# Patient Record
Sex: MALE | Race: WHITE | NOT HISPANIC OR LATINO | URBAN - METROPOLITAN AREA
[De-identification: names, ages, dates, MRNs, and addresses within clinical notes are randomized per-mention and may not be internally consistent; named-entity substitution may affect disease eponyms.]

---

## 2020-12-07 ENCOUNTER — OFFICE VISIT (OUTPATIENT)
Dept: URGENT CARE | Facility: CLINIC | Age: 25
End: 2020-12-07
Payer: OTHER GOVERNMENT

## 2020-12-07 VITALS
WEIGHT: 215.2 LBS | RESPIRATION RATE: 18 BRPM | DIASTOLIC BLOOD PRESSURE: 84 MMHG | HEIGHT: 70 IN | BODY MASS INDEX: 30.81 KG/M2 | SYSTOLIC BLOOD PRESSURE: 130 MMHG | TEMPERATURE: 96.5 F | HEART RATE: 79 BPM | OXYGEN SATURATION: 99 %

## 2020-12-07 DIAGNOSIS — Z20.822 ENCOUNTER FOR SCREENING LABORATORY TESTING FOR COVID-19 VIRUS: ICD-10-CM

## 2020-12-07 DIAGNOSIS — R05.9 COUGH: Primary | ICD-10-CM

## 2020-12-07 PROCEDURE — 99213 OFFICE O/P EST LOW 20 MIN: CPT | Performed by: PREVENTIVE MEDICINE

## 2020-12-07 PROCEDURE — U0003 INFECTIOUS AGENT DETECTION BY NUCLEIC ACID (DNA OR RNA); SEVERE ACUTE RESPIRATORY SYNDROME CORONAVIRUS 2 (SARS-COV-2) (CORONAVIRUS DISEASE [COVID-19]), AMPLIFIED PROBE TECHNIQUE, MAKING USE OF HIGH THROUGHPUT TECHNOLOGIES AS DESCRIBED BY CMS-2020-01-R: HCPCS | Performed by: PREVENTIVE MEDICINE

## 2020-12-07 RX ORDER — MOMETASONE FUROATE 220 UG/1
INHALANT RESPIRATORY (INHALATION) DAILY
COMMUNITY
Start: 2020-10-01

## 2020-12-07 RX ORDER — FLUTICASONE PROPIONATE 50 MCG
1 SPRAY, SUSPENSION (ML) NASAL DAILY
COMMUNITY

## 2020-12-07 RX ORDER — GUANFACINE 1 MG/1
TABLET ORAL
COMMUNITY
Start: 2020-10-13

## 2020-12-07 RX ORDER — BROMPHENIRAMINE MALEATE, PSEUDOEPHEDRINE HYDROCHLORIDE, AND DEXTROMETHORPHAN HYDROBROMIDE 2; 30; 10 MG/5ML; MG/5ML; MG/5ML
5 SYRUP ORAL 4 TIMES DAILY PRN
Qty: 120 ML | Refills: 0 | Status: SHIPPED | OUTPATIENT
Start: 2020-12-07

## 2020-12-07 RX ORDER — LAMOTRIGINE 300 MG/1
300 TABLET, EXTENDED RELEASE ORAL DAILY
COMMUNITY
Start: 2020-10-13

## 2020-12-09 ENCOUNTER — TELEPHONE (OUTPATIENT)
Dept: URGENT CARE | Age: 25
End: 2020-12-09

## 2020-12-09 LAB — SARS-COV-2 RNA SPEC QL NAA+PROBE: DETECTED

## 2022-08-22 ENCOUNTER — OFFICE VISIT (OUTPATIENT)
Dept: URGENT CARE | Facility: CLINIC | Age: 27
End: 2022-08-22
Payer: COMMERCIAL

## 2022-08-22 VITALS
OXYGEN SATURATION: 99 % | TEMPERATURE: 98.5 F | RESPIRATION RATE: 18 BRPM | HEART RATE: 109 BPM | HEIGHT: 70 IN | BODY MASS INDEX: 34.22 KG/M2 | WEIGHT: 239 LBS

## 2022-08-22 DIAGNOSIS — B00.1 HERPES LABIALIS: Primary | ICD-10-CM

## 2022-08-22 DIAGNOSIS — L01.00 IMPETIGO: ICD-10-CM

## 2022-08-22 PROCEDURE — 99203 OFFICE O/P NEW LOW 30 MIN: CPT | Performed by: PHYSICIAN ASSISTANT

## 2022-08-22 RX ORDER — VALACYCLOVIR HYDROCHLORIDE 1 G/1
1000 TABLET, FILM COATED ORAL 3 TIMES DAILY
Qty: 21 TABLET | Refills: 0 | Status: SHIPPED | OUTPATIENT
Start: 2022-08-22 | End: 2022-08-29

## 2022-08-22 NOTE — PROGRESS NOTES
[facility]        NAME: Rianna Heath is a 27 y o  male  : 1995    MRN: [id number]  DATE: 2022  TIME: 11:52 AM    Assessment and Plan   Herpes labialis [B00 1]  1  Herpes labialis  valACYclovir (VALTREX) 1,000 mg tablet   2  Impetigo  mupirocin (BACTROBAN) 2 % ointment     Will treat with a weeks' worth of valtrex given proximity to eye  Discussed strict return to care precautions as well as red flag symptoms which should prompt immediate ED referral  Pt verbalized understanding and is in agreement with plan  Please follow up with your primary care provider within the next week  Please remember that your visit today was with an urgent care provider and should not replace follow up with your primary care provider for chronic medical issues or annual physicals  Patient Instructions       Follow up with PCP in 3-5 days  Proceed to  ER if symptoms worsen  Chief Complaint     Chief Complaint   Patient presents with    Rash     Herpes like inflammation on upper lip and eye lid x 2 days         History of Present Illness       Patient presents with cold sore on upper central vermilion border and similar appearing rash just medial to medial canthus of right eye  States he had the same rash a few years ago and was told that they were both herpes  Slightly itchy  No visual changes, fevers  States he was sick with a bug 2 weeks ago  Started using Abreva yesterday  Review of Systems   Review of Systems   Constitutional: Negative for chills, diaphoresis and fever  HENT: Negative for congestion, rhinorrhea and sore throat  Eyes: Negative for discharge and itching  Respiratory: Negative for cough, chest tightness, shortness of breath and wheezing  Cardiovascular: Negative for chest pain  Gastrointestinal: Negative for diarrhea, nausea and vomiting  Musculoskeletal: Negative for myalgias  Skin: Positive for rash     Neurological: Negative for weakness and numbness  Current Medications       Current Outpatient Medications:     fluticasone (FLONASE) 50 mcg/act nasal spray, 1 spray into each nostril daily, Disp: , Rfl:     lamoTRIgine  MG TB24, Take 300 mg by mouth daily, Disp: , Rfl:     mometasone (Asmanex, 120 Metered Doses,) 220 MCG/INH inhaler, daily, Disp: , Rfl:     mupirocin (BACTROBAN) 2 % ointment, Apply topically 3 (three) times a day, Disp: 22 g, Rfl: 0    valACYclovir (VALTREX) 1,000 mg tablet, Take 1 tablet (1,000 mg total) by mouth 3 (three) times a day for 7 days, Disp: 21 tablet, Rfl: 0    brompheniramine-pseudoephedrine-DM 30-2-10 MG/5ML syrup, Take 5 mL by mouth 4 (four) times a day as needed for congestion or cough (Patient not taking: Reported on 8/22/2022), Disp: 120 mL, Rfl: 0    guanFACINE (TENEX) 1 mg tablet, daily at bedtime (Patient not taking: Reported on 8/22/2022), Disp: , Rfl:     Current Allergies     Allergies as of 08/22/2022 - Reviewed 08/22/2022   Allergen Reaction Noted    Cefaclor Hives 10/13/2020    Erythromycin Other (See Comments) 10/13/2020            The following portions of the patient's history were reviewed and updated as appropriate: allergies, current medications, past family history, past medical history, past social history, past surgical history and problem list      Past Medical History:   Diagnosis Date    ADHD (attention deficit hyperactivity disorder)     Asthma        No past surgical history on file  No family history on file  Medications have been verified  Objective   Pulse (!) 109   Temp 98 5 °F (36 9 °C)   Resp 18   Ht 5' 10" (1 778 m)   Wt 108 kg (239 lb)   SpO2 99%   BMI 34 29 kg/m²        Physical Exam     Physical Exam  Vitals and nursing note reviewed  Constitutional:       General: He is not in acute distress  Appearance: Normal appearance  He is not ill-appearing  HENT:      Head: Normocephalic and atraumatic       Cardiovascular:      Rate and Rhythm: Normal rate  Pulmonary:      Effort: Pulmonary effort is normal  No respiratory distress  Skin:     General: Skin is warm and dry  Capillary Refill: Capillary refill takes less than 2 seconds  Neurological:      Mental Status: He is alert and oriented to person, place, and time     Psychiatric:         Behavior: Behavior normal

## 2023-03-07 ENCOUNTER — OFFICE VISIT (OUTPATIENT)
Dept: FAMILY MEDICINE CLINIC | Facility: CLINIC | Age: 28
End: 2023-03-07

## 2023-03-07 VITALS
BODY MASS INDEX: 35.55 KG/M2 | WEIGHT: 240 LBS | SYSTOLIC BLOOD PRESSURE: 140 MMHG | RESPIRATION RATE: 16 BRPM | TEMPERATURE: 97.9 F | OXYGEN SATURATION: 98 % | DIASTOLIC BLOOD PRESSURE: 100 MMHG | HEIGHT: 69 IN | HEART RATE: 107 BPM

## 2023-03-07 DIAGNOSIS — Z11.59 NEED FOR HEPATITIS C SCREENING TEST: ICD-10-CM

## 2023-03-07 DIAGNOSIS — Z11.4 SCREENING FOR HIV (HUMAN IMMUNODEFICIENCY VIRUS): ICD-10-CM

## 2023-03-07 DIAGNOSIS — J45.20 MILD INTERMITTENT ASTHMA WITHOUT COMPLICATION: ICD-10-CM

## 2023-03-07 DIAGNOSIS — Z13.89 SCREENING FOR CARDIOVASCULAR, RESPIRATORY, AND GENITOURINARY DISEASES: ICD-10-CM

## 2023-03-07 DIAGNOSIS — F95.9 TIC DISORDER: ICD-10-CM

## 2023-03-07 DIAGNOSIS — Z13.29 SCREENING FOR THYROID DISORDER: ICD-10-CM

## 2023-03-07 DIAGNOSIS — Z13.6 SCREENING FOR CARDIOVASCULAR, RESPIRATORY, AND GENITOURINARY DISEASES: ICD-10-CM

## 2023-03-07 DIAGNOSIS — Z13.83 SCREENING FOR CARDIOVASCULAR, RESPIRATORY, AND GENITOURINARY DISEASES: ICD-10-CM

## 2023-03-07 DIAGNOSIS — G40.909 SEIZURE DISORDER (HCC): ICD-10-CM

## 2023-03-07 DIAGNOSIS — Z00.00 WELL ADULT EXAM: Primary | ICD-10-CM

## 2023-03-07 RX ORDER — FLUTICASONE FUROATE 100 UG/1
POWDER RESPIRATORY (INHALATION)
COMMUNITY
Start: 2022-07-01

## 2023-03-07 RX ORDER — GUANFACINE 1 MG/1
1 TABLET ORAL
Start: 2023-03-07

## 2023-03-07 NOTE — ASSESSMENT & PLAN NOTE
Follows neurologist Dr Morenita Kim yearly  He was diagnosed with seizure disorder in 2011 and has been on Lamictal since then  No seizures since the one seizure in 2011

## 2023-03-07 NOTE — PROGRESS NOTES
FAMILY PRACTICE HEALTH MAINTENANCE OFFICE VISIT  [facility]    NAME: Shwetha Tate  AGE: 32 y o  SEX: male  : 1995     DATE: 3/7/2023    Assessment and Plan     1  Well adult exam    2  Need for hepatitis C screening test  -     Hepatitis C Antibody ([facility], [facility]); Future    3  Screening for HIV (human immunodeficiency virus)  -     HIV 1/2 Antigen/Antibody (Fourth Generation) with Reflex Testing ([facility], [facility], or EXTERNAL LAB); Future    4  Screening for cardiovascular, respiratory, and genitourinary diseases  -     CBC and differential; Future  -     Comprehensive metabolic panel; Future  -     Lipid Panel with Direct LDL reflex; Future    5  Screening for thyroid disorder  -     TSH, 3rd generation with Free T4 reflex; Future    6  Tic disorder  Assessment & Plan:  Managed by neurologist Dr Nadir Valles  Currently stable on guanfacine  Orders:  -     guanFACINE (TENEX) 1 mg tablet; Take 1 tablet (1 mg total) by mouth daily at bedtime    7  Seizure disorder [facility])  Assessment & Plan:  [address] [place] [address] neurologist Dr Nadir Valles yearly  He was diagnosed with seizure disorder in  and has been on Lamictal since then  No seizures since the one seizure in         8  Mild intermittent asthma without complication  Assessment & Plan:  Controlled on Arnuity  Patient Counseling:   Nutrition: Stressed importance of a well balanced diet, moderation of sodium/saturated fat, caloric balance and sufficient intake of fiber  Exercise: Stressed the importance of regular exercise with a goal of 150 minutes per week  Dental Health: Discussed daily flossing and brushing and regular dental visits   Immunizations reviewed: Up To Date  Discussed benefits of:  Screening labs  BMI Counseling: Body mass index is 35 96 kg/m²  Discussed with patient's BMI with him   The BMI is above normal  Nutrition recommendations include reducing portion sizes, decreasing overall calorie intake, 3-5 servings of fruits/vegetables daily, reducing fast food intake, consuming healthier snacks and decreasing soda and/or juice intake  Exercise recommendations include moderate aerobic physical activity for 150 minutes/week  Return in about 2 weeks (around 3/21/2023)  Chief Complaint     Chief Complaint   Patient presents with   • \[facility]\"" Care     [facility]       History of Present Illness     HPI    Well Adult Physical   Patient here for a comprehensive physical exam       Diet and Physical Activity  Diet: well balanced diet  Exercise: infrequently      Depression Screen  PHQ-2/9 Depression Screening    Little interest or pleasure in doing things: 0 - not at all  Feeling down, depressed, or hopeless: 1 - several days  PHQ-2 Score: 1  PHQ-2 Interpretation: Negative depression screen          General Health  Hearing: Normal:  bilateral  Vision: no vision problems  Dental: no dental visits for >1 year, brushes teeth twice daily and flosses teeth occasionally    Reproductive Health  No issues       The following portions of the patient's history were reviewed and updated as appropriate: allergies, current medications, past family history, past medical history, past social history, past surgical history and problem list     Review of Systems     Review of Systems   Constitutional: Negative  HENT: Negative  Eyes: Negative  Respiratory: Negative  Cardiovascular: Negative  Gastrointestinal: Negative  Endocrine: Negative  Genitourinary: Negative  Musculoskeletal: Negative  Neurological: Negative  Hematological: Negative  Psychiatric/Behavioral: Negative  Past Medical History     Past Medical History:   Diagnosis Date   • ADHD (attention deficit hyperactivity disorder)    • Asthma        Past Surgical History     History reviewed  No pertinent surgical history      Social History     Social History     Socioeconomic History   • Marital status: Single     Spouse name: None   • Number of children: None   • Years of education: None   • Highest education level: None   Occupational History   • None   Tobacco Use   • Smoking status: Never   • Smokeless tobacco: Never   Substance and Sexual Activity   • Alcohol use: Yes   • Drug use: None     Comment: rare   • Sexual activity: Never   Other Topics Concern   • None   Social History Narrative   • None     Social Determinants of Health     Financial Resource Strain: Not on file   Food Insecurity: Not on file   Transportation Needs: Not on file   Physical Activity: Not on file   Stress: Not on file   Social Connections: Not on file   Intimate Partner Violence: Not on file   Housing Stability: Not on file       Family History     History reviewed  No pertinent family history  Current Medications       Current Outpatient Medications:   •  fluticasone (Arnuity Ellipta) 100 MCG/ACT AEPB inhaler, Daily at night, Disp: , Rfl:   •  fluticasone (FLONASE) 50 mcg/act nasal spray, 1 spray into each nostril daily, Disp: , Rfl:   •  guanFACINE (TENEX) 1 mg tablet, Take 1 tablet (1 mg total) by mouth daily at bedtime, Disp: , Rfl:   •  lamoTRIgine  MG TB24, Take 300 mg by mouth daily, Disp: , Rfl:   •  mupirocin (BACTROBAN) 2 % ointment, Apply topically 3 (three) times a day, Disp: 22 g, Rfl: 0  •  valACYclovir (VALTREX) 1,000 mg tablet, Take 1 tablet (1,000 mg total) by mouth 3 (three) times a day for 7 days, Disp: 21 tablet, Rfl: 0     Allergies     Allergies   Allergen Reactions   • Cefaclor Hives   • Erythromycin Other (See Comments)     unknown       Objective     /100 (BP Location: Left arm, Patient Position: Sitting, Cuff Size: Large)   Pulse (!) 107   Temp 97 9 °F (36 6 °C) (Temporal)   Resp 16   Ht 5' 8 5" (1 74 m)   Wt 109 kg (240 lb)   SpO2 98%   BMI 35 96 kg/m²      Physical Exam  Constitutional:       General: He is not in acute distress  Appearance: He is well-developed  He is not diaphoretic     HENT:      Head: Normocephalic and atraumatic  Right Ear: Hearing, tympanic membrane, ear canal and external ear normal       Left Ear: Hearing, tympanic membrane, ear canal and external ear normal       Nose: Nose normal       Mouth/Throat:      Pharynx: No oropharyngeal exudate  Eyes:      General: No scleral icterus  Right eye: No discharge  Left eye: No discharge  Conjunctiva/sclera: Conjunctivae normal       Pupils: Pupils are equal, round, and reactive to light  Neck:      Thyroid: No thyromegaly  Trachea: No tracheal deviation  Cardiovascular:      Rate and Rhythm: Normal rate and regular rhythm  Heart sounds: Normal heart sounds  No murmur heard  No friction rub  No gallop  Pulmonary:      Effort: Pulmonary effort is normal  No respiratory distress  Breath sounds: Normal breath sounds  No wheezing or rales  Chest:      Chest wall: No tenderness  Abdominal:      General: Bowel sounds are normal  There is no distension  Palpations: Abdomen is soft  There is no mass  Tenderness: There is no abdominal tenderness  There is no guarding or rebound  Musculoskeletal:         General: No tenderness or deformity  Normal range of motion  Cervical back: Normal range of motion and neck supple  Skin:     General: Skin is warm and dry  Coloration: Skin is not pale  Findings: No erythema or rash  Neurological:      Mental Status: He is alert and oriented to person, place, and time  Motor: No abnormal muscle tone  Coordination: Coordination normal       Deep Tendon Reflexes: Reflexes normal    Psychiatric:         Behavior: Behavior normal          Thought Content: Thought content normal          Judgment: Judgment normal            No results found          MD SALONI SchroederNSAS [facility]

## 2023-03-17 LAB
ALBUMIN SERPL-MCNC: 5 G/DL (ref 4.1–5.2)
ALBUMIN/GLOB SERPL: 2.3 {RATIO} (ref 1.2–2.2)
ALP SERPL-CCNC: 80 IU/L (ref 44–121)
ALT SERPL-CCNC: 58 IU/L (ref 0–44)
AST SERPL-CCNC: 32 IU/L (ref 0–40)
BASOPHILS # BLD AUTO: 0 X10E3/UL (ref 0–0.2)
BASOPHILS NFR BLD AUTO: 1 %
BILIRUB SERPL-MCNC: 0.6 MG/DL (ref 0–1.2)
BUN SERPL-MCNC: 11 MG/DL (ref 6–20)
BUN/CREAT SERPL: 10 (ref 9–20)
CALCIUM SERPL-MCNC: 9.9 MG/DL (ref 8.7–10.2)
CHLORIDE SERPL-SCNC: 104 MMOL/L (ref 96–106)
CHOLEST SERPL-MCNC: 132 MG/DL (ref 100–199)
CO2 SERPL-SCNC: 24 MMOL/L (ref 20–29)
CREAT SERPL-MCNC: 1.07 MG/DL (ref 0.76–1.27)
EGFR: 96 ML/MIN/1.73
EOSINOPHIL # BLD AUTO: 0.3 X10E3/UL (ref 0–0.4)
EOSINOPHIL NFR BLD AUTO: 6 %
ERYTHROCYTE [DISTWIDTH] IN BLOOD BY AUTOMATED COUNT: 12.3 % (ref 11.6–15.4)
GLOBULIN SER-MCNC: 2.2 G/DL (ref 1.5–4.5)
GLUCOSE SERPL-MCNC: 88 MG/DL (ref 70–99)
HCT VFR BLD AUTO: 45.5 % (ref 37.5–51)
HCV AB S/CO SERPL IA: NON REACTIVE
HDLC SERPL-MCNC: 48 MG/DL
HGB BLD-MCNC: 16.6 G/DL (ref 13–17.7)
HIV 1+2 AB+HIV1 P24 AG SERPL QL IA: NON REACTIVE
IMM GRANULOCYTES # BLD: 0 X10E3/UL (ref 0–0.1)
IMM GRANULOCYTES NFR BLD: 0 %
LDLC SERPL CALC-MCNC: 72 MG/DL (ref 0–99)
LYMPHOCYTES # BLD AUTO: 1.8 X10E3/UL (ref 0.7–3.1)
LYMPHOCYTES NFR BLD AUTO: 29 %
MCH RBC QN AUTO: 31.3 PG (ref 26.6–33)
MCHC RBC AUTO-ENTMCNC: 36.5 G/DL (ref 31.5–35.7)
MCV RBC AUTO: 86 FL (ref 79–97)
MICRODELETION SYND BLD/T FISH: NORMAL
MONOCYTES # BLD AUTO: 0.5 X10E3/UL (ref 0.1–0.9)
MONOCYTES NFR BLD AUTO: 8 %
MORPHOLOGY BLD-IMP: ABNORMAL
NEUTROPHILS # BLD AUTO: 3.4 X10E3/UL (ref 1.4–7)
NEUTROPHILS NFR BLD AUTO: 56 %
PLATELET # BLD AUTO: 264 X10E3/UL (ref 150–450)
POTASSIUM SERPL-SCNC: 4.7 MMOL/L (ref 3.5–5.2)
PROT SERPL-MCNC: 7.2 G/DL (ref 6–8.5)
RBC # BLD AUTO: 5.3 X10E6/UL (ref 4.14–5.8)
SODIUM SERPL-SCNC: 141 MMOL/L (ref 134–144)
TRIGL SERPL-MCNC: 53 MG/DL (ref 0–149)
TSH SERPL DL<=0.005 MIU/L-ACNC: 1.56 UIU/ML (ref 0.45–4.5)
WBC # BLD AUTO: 6 X10E3/UL (ref 3.4–10.8)

## 2023-03-21 ENCOUNTER — OFFICE VISIT (OUTPATIENT)
Dept: FAMILY MEDICINE CLINIC | Facility: CLINIC | Age: 28
End: 2023-03-21

## 2023-03-21 VITALS
SYSTOLIC BLOOD PRESSURE: 128 MMHG | WEIGHT: 234 LBS | RESPIRATION RATE: 16 BRPM | HEIGHT: 69 IN | HEART RATE: 100 BPM | DIASTOLIC BLOOD PRESSURE: 88 MMHG | BODY MASS INDEX: 34.66 KG/M2 | TEMPERATURE: 97.1 F

## 2023-03-21 DIAGNOSIS — R03.0 ELEVATED BLOOD PRESSURE READING: Primary | ICD-10-CM

## 2023-03-21 DIAGNOSIS — R74.01 ELEVATED ALT MEASUREMENT: ICD-10-CM

## 2023-03-21 NOTE — PROGRESS NOTES
Assessment/Plan:    1  Elevated blood pressure reading  Assessment & Plan:  Continue weight loss efforts, diet, exercise  Advised to limit salt  Will continue to monitor BP      2  Elevated ALT measurement  Comments:  no ETOH or excessive Tylenol  Discussed most likely fatty liver  Repeat labs at next CPE, consider additional testing at that time             There are no Patient Instructions on file for this visit  Return if symptoms worsen or fail to improve  Subjective:      Patient ID: Misa Ro is a 32 y o  male  Chief Complaint   Patient presents with   • Follow-up     Review lab work results and BP check Karey       Here today for BP check and lab review  BP was slightly elevated at previous visit  He has been working on diet and exercise and has lost 7 pounds since his last visit  No concerns today      The following portions of the patient's history were reviewed and updated as appropriate: allergies, current medications, past family history, past medical history, past social history, past surgical history and problem list     Review of Systems   Constitutional: Negative for chills, fatigue and fever  Respiratory: Negative for cough, shortness of breath and wheezing  Cardiovascular: Negative for chest pain, palpitations and leg swelling  Gastrointestinal: Negative for abdominal pain, diarrhea, nausea and vomiting  Skin: Negative for rash  Neurological: Negative for dizziness and headaches           Current Outpatient Medications   Medication Sig Dispense Refill   • fluticasone (Arnuity Ellipta) 100 MCG/ACT AEPB inhaler Daily at night     • fluticasone (FLONASE) 50 mcg/act nasal spray 1 spray into each nostril daily     • guanFACINE (TENEX) 1 mg tablet Take 1 tablet (1 mg total) by mouth daily at bedtime     • lamoTRIgine  MG TB24 Take 300 mg by mouth daily     • mupirocin (BACTROBAN) 2 % ointment Apply topically 3 (three) times a day 22 g 0   • valACYclovir (VALTREX) 1,000 mg tablet Take 1 tablet (1,000 mg total) by mouth 3 (three) times a day for 7 days 21 tablet 0     No current facility-administered medications for this visit  Objective:    /88   Pulse 100   Temp (!) 97 1 °F (36 2 °C)   Resp 16   Ht 5' 8 5" (1 74 m)   Wt 106 kg (234 lb)   BMI 35 06 kg/m²        Physical Exam  Vitals and nursing note reviewed  Constitutional:       Appearance: He is well-developed  Cardiovascular:      Rate and Rhythm: Normal rate and regular rhythm  Heart sounds: Normal heart sounds  No murmur heard  Pulmonary:      Effort: Pulmonary effort is normal       Breath sounds: Normal breath sounds  Skin:     General: Skin is warm and dry  Neurological:      Mental Status: He is alert                  Jacqui Sulaimna

## 2023-12-12 ENCOUNTER — OFFICE VISIT (OUTPATIENT)
Dept: FAMILY MEDICINE CLINIC | Facility: CLINIC | Age: 28
End: 2023-12-12
Payer: COMMERCIAL

## 2023-12-12 VITALS
WEIGHT: 240 LBS | RESPIRATION RATE: 18 BRPM | HEART RATE: 88 BPM | TEMPERATURE: 97.6 F | SYSTOLIC BLOOD PRESSURE: 130 MMHG | DIASTOLIC BLOOD PRESSURE: 80 MMHG | HEIGHT: 69 IN | BODY MASS INDEX: 35.55 KG/M2 | OXYGEN SATURATION: 98 %

## 2023-12-12 DIAGNOSIS — G40.909 SEIZURE DISORDER (HCC): Primary | ICD-10-CM

## 2023-12-12 DIAGNOSIS — J45.20 MILD INTERMITTENT ASTHMA WITHOUT COMPLICATION: ICD-10-CM

## 2023-12-12 DIAGNOSIS — F95.9 TIC DISORDER: ICD-10-CM

## 2023-12-12 DIAGNOSIS — R03.0 ELEVATED BLOOD PRESSURE READING: ICD-10-CM

## 2023-12-12 PROCEDURE — 99214 OFFICE O/P EST MOD 30 MIN: CPT | Performed by: FAMILY MEDICINE

## 2023-12-12 NOTE — PROGRESS NOTES
Name: Claudio Card      : 1995      MRN: [id number]  Encounter Provider: Xiomara Betancur MD  Encounter Date: 2023   Encounter department: [address] [place] [address]     1. Seizure disorder [facility])  Assessment & Plan:  [address] [facility]  neurologist Dr. Delvis Barcenas. On Keppra. No seizures since .       2. Tic disorder  Assessment & Plan:  Follows neurologist Dr. Delvis Barcenas. On Guanfacine. 3. Mild intermittent asthma without complication  Assessment & Plan:  [facility]  pulmonologist, stable. 4. Elevated blood pressure reading  Assessment & Plan:  BP is good in office today. Asymptomatic. Depression Screening and Follow-up Plan: Patient was screened for depression during today's encounter. They screened negative with a PHQ-2 score of 0. Subjective      HPI  He is here today for routine follow up visit. No acute issues or concerns. Review of Systems   Constitutional: Negative. HENT: Negative. Eyes: Negative. Respiratory: Negative. Gastrointestinal: Negative. Endocrine: Negative. Genitourinary: Negative. Musculoskeletal: Negative. Neurological: Negative. Hematological: Negative. Psychiatric/Behavioral: Negative.          Current Outpatient Medications on File Prior to Visit   Medication Sig    fluticasone (Arnuity Ellipta) 100 MCG/ACT AEPB inhaler Daily at night    fluticasone (FLONASE) 50 mcg/act nasal spray 1 spray into each nostril daily    guanFACINE (TENEX) 1 mg tablet Take 1 tablet (1 mg total) by mouth daily at bedtime    lamoTRIgine  MG TB24 Take 300 mg by mouth daily    valACYclovir (VALTREX) 1,000 mg tablet Take 1 tablet (1,000 mg total) by mouth 3 (three) times a day for 7 days    mupirocin (BACTROBAN) 2 % ointment Apply topically 3 (three) times a day (Patient not taking: Reported on 2023)       Objective     /80   Pulse 88   Temp 97.6 °F (36.4 °C)   Resp 18   Ht 5' 8.5" (1.74 m)   Wt 109 kg (240 lb) SpO2 98%   BMI 35.96 kg/m²     Physical Exam  Constitutional:       General: He is not in acute distress. Appearance: He is well-developed. He is not diaphoretic. HENT:      Head: Normocephalic and atraumatic. Eyes:      General: No scleral icterus. Right eye: No discharge. Left eye: No discharge. Conjunctiva/sclera: Conjunctivae normal.   Cardiovascular:      Rate and Rhythm: Normal rate and regular rhythm. Pulses: Normal pulses. Pulmonary:      Effort: Pulmonary effort is normal. No respiratory distress. Breath sounds: Normal breath sounds. No stridor. No wheezing, rhonchi or rales. Chest:      Chest wall: No tenderness. Musculoskeletal:         General: Normal range of motion. Cervical back: Normal range of motion. Skin:     General: Skin is warm. Neurological:      Mental Status: He is alert and oriented to person, place, and time. Psychiatric:         Mood and Affect: Mood normal.         Behavior: Behavior normal.         Thought Content:  Thought content normal.         Judgment: Judgment normal.       Wendy Mahoney MD

## 2024-05-14 ENCOUNTER — OFFICE VISIT (OUTPATIENT)
Dept: FAMILY MEDICINE CLINIC | Facility: CLINIC | Age: 29
End: 2024-05-14
Payer: COMMERCIAL

## 2024-05-14 VITALS
WEIGHT: 246.4 LBS | HEART RATE: 112 BPM | RESPIRATION RATE: 20 BRPM | TEMPERATURE: 98 F | HEIGHT: 69 IN | SYSTOLIC BLOOD PRESSURE: 122 MMHG | DIASTOLIC BLOOD PRESSURE: 98 MMHG | BODY MASS INDEX: 36.5 KG/M2

## 2024-05-14 DIAGNOSIS — B00.9 HERPES SIMPLEX: Primary | ICD-10-CM

## 2024-05-14 DIAGNOSIS — G40.909 SEIZURE DISORDER (HCC): ICD-10-CM

## 2024-05-14 PROCEDURE — 99213 OFFICE O/P EST LOW 20 MIN: CPT | Performed by: FAMILY MEDICINE

## 2024-05-14 RX ORDER — VALACYCLOVIR HYDROCHLORIDE 1 G/1
TABLET, FILM COATED ORAL
Qty: 40 TABLET | Refills: 0 | Status: SHIPPED | OUTPATIENT
Start: 2024-05-14 | End: 2025-05-14

## 2024-05-14 NOTE — PROGRESS NOTES
"Name: Pastor Tate      : 1995      MRN: [id number]  Encounter Provider: Lacy Kruger DO  Encounter Date: 2024   Encounter department: [facility]    Assessment & Plan     1. Herpes simplex  -     valACYclovir (VALTREX) 1,000 mg tablet; Take 2 at onset of herpes outbreak, then take another 2 in 12 hours.    2. Seizure disorder (HCC)  Assessment & Plan:  On lamotrigine  Managed by neurology       Return if symptoms worsen or fail to improve.    Subjective      He gets recurrent herpes on his right lower eye lid.  He has taken valtrex in the past       Review of Systems    Current Outpatient Medications on File Prior to Visit   Medication Sig   • fluticasone (Arnuity Ellipta) 100 MCG/ACT AEPB inhaler Daily at night   • fluticasone (FLONASE) 50 mcg/act nasal spray 1 spray into each nostril daily   • guanFACINE (TENEX) 1 mg tablet Take 1 tablet (1 mg total) by mouth daily at bedtime   • lamoTRIgine  MG TB24 Take 300 mg by mouth daily   • mupirocin (BACTROBAN) 2 % ointment Apply topically 3 (three) times a day   • [DISCONTINUED] valACYclovir (VALTREX) 1,000 mg tablet Take 1 tablet (1,000 mg total) by mouth 3 (three) times a day for 7 days (Patient not taking: Reported on 2024)       Objective     /98   Pulse (!) 112   Temp 98 °F (36.7 °C)   Resp 20   Ht 5' 8.5\" (1.74 m)   Wt 112 kg (246 lb 6.4 oz)   BMI 36.92 kg/m²     Physical Exam  Vitals and nursing note reviewed.   Constitutional:       Appearance: He is well-developed.   HENT:      Head: Normocephalic and atraumatic.      Right Ear: External ear normal.      Left Ear: External ear normal.   Cardiovascular:      Rate and Rhythm: Normal rate and regular rhythm.      Heart sounds: Normal heart sounds.   Pulmonary:      Effort: Pulmonary effort is normal. No respiratory distress.      Breath sounds: Normal breath sounds. No wheezing.   Skin:     Findings: Rash (right upper eyelid - see picture) present. "             Lacy Kruger, DO

## 2024-06-05 DIAGNOSIS — B00.9 HERPES SIMPLEX: ICD-10-CM

## 2024-06-06 RX ORDER — VALACYCLOVIR HYDROCHLORIDE 1 G/1
TABLET, FILM COATED ORAL
Qty: 120 TABLET | Refills: 1 | Status: SHIPPED | OUTPATIENT
Start: 2024-06-06 | End: 2025-06-06

## 2024-06-20 DIAGNOSIS — B00.9 HERPES SIMPLEX: ICD-10-CM

## 2024-06-20 RX ORDER — VALACYCLOVIR HYDROCHLORIDE 1 G/1
TABLET, FILM COATED ORAL
Qty: 360 TABLET | Refills: 1 | Status: SHIPPED | OUTPATIENT
Start: 2024-06-20 | End: 2024-07-04

## 2024-06-25 ENCOUNTER — OFFICE VISIT (OUTPATIENT)
Dept: URGENT CARE | Facility: CLINIC | Age: 29
End: 2024-06-25
Payer: COMMERCIAL

## 2024-06-25 VITALS
HEART RATE: 108 BPM | WEIGHT: 246 LBS | DIASTOLIC BLOOD PRESSURE: 102 MMHG | OXYGEN SATURATION: 100 % | BODY MASS INDEX: 36.86 KG/M2 | RESPIRATION RATE: 20 BRPM | TEMPERATURE: 96.8 F | SYSTOLIC BLOOD PRESSURE: 148 MMHG

## 2024-06-25 DIAGNOSIS — R21 PENILE RASH: Primary | ICD-10-CM

## 2024-06-25 PROCEDURE — 99213 OFFICE O/P EST LOW 20 MIN: CPT | Performed by: FAMILY MEDICINE

## 2024-06-25 NOTE — PROGRESS NOTES
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 Penile rash  terbinafine (LamISIL) 1 % cream            Patient Instructions     Small penile ulcerations present, appearing to stem from wet, damp environment. Recommend anti-fungal cream and OTC powder to help keep region dry. Pt denies need for STI testing today. Recommend follow up with PCP in 3-5 days if no improvement. Proceed to ER if symptoms worsen.    Chief Complaint     Chief Complaint   Patient presents with   • Rash     Started yesterday, on penis.          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 He states that he noticed a brownish, raw area on the glans penis. He states that it feels chaffed. He noted that it started with the heat. He felt that he was sweaty and rubbing while at work. He has not tried any agents for it. He notes some mild pain. Denies any discharge. Denies any STIs. Has 1 partner (fiance) and does not believe that he requires any STI testing at this time.     Review of Systems     Review of Systems   Constitutional:  Negative for chills and fever.   HENT:  Negative for congestion and sore throat.    Eyes:  Negative for discharge and itching.   Respiratory:  Negative for cough and wheezing.    Gastrointestinal:  Negative for abdominal pain, nausea and vomiting.   Genitourinary:  Negative for dysuria and flank pain.   Musculoskeletal:  Negative for arthralgias, myalgias and neck pain.   Skin:  Positive for rash.   Allergic/Immunologic: Negative for food allergies.   Neurological:  Negative for light-headedness and headaches.   Psychiatric/Behavioral:  Negative for agitation. The patient is not nervous/anxious.        Current Medications       Current Outpatient Medications:   •  terbinafine (LamISIL) 1 % cream,  Apply topically daily, Disp: 42 g, Rfl: 0  •  fluticasone (Arnuity Ellipta) 100 MCG/ACT AEPB inhaler, Daily at night, Disp: , Rfl:   •  fluticasone (FLONASE) 50 mcg/act nasal spray, 1 spray into each nostril daily, Disp: , Rfl:   •  guanFACINE (TENEX) 1 mg tablet, Take 1 tablet (1 mg total) by mouth daily at bedtime, Disp: , Rfl:   •  lamoTRIgine  MG TB24, Take 300 mg by mouth daily, Disp: , Rfl:   •  mupirocin (BACTROBAN) 2 % ointment, Apply topically 3 (three) times a day, Disp: 22 g, Rfl: 0  •  valACYclovir (VALTREX) 1,000 mg tablet, TAKE 2 TABLETS AT ONSET OF HERPES OUTBREAK, THEN TAKE ANOTHER 2 TABLETS IN 12 HOURS., Disp: 360 tablet, Rfl: 1    Current Allergies     Allergies as of 06/25/2024 - Reviewed 06/25/2024   Allergen Reaction Noted   • Cefaclor Hives 10/13/2020   • Erythromycin Other (See Comments) 10/13/2020            The following portions of the patient's history were reviewed and updated as appropriate: allergies, current medications, past family history, past medical history, past social history, past surgical history and problem list.     Past Medical History:   Diagnosis Date   • ADHD (attention deficit hyperactivity disorder)    • Asthma        History reviewed. No pertinent surgical history.    History reviewed. No pertinent family history.    Medications have been verified.    Objective     BP (!) 148/102   Pulse (!) 108   Temp (!) 96.8 °F (36 °C)   Resp 20   Wt 112 kg (246 lb)   SpO2 100%   BMI 36.86 kg/m²   No LMP for male patient.     Physical Exam     Physical Exam  Vitals reviewed.   Constitutional:       General: He is not in acute distress.     Appearance: Normal appearance.   HENT:      Head: Normocephalic and atraumatic.   Eyes:      Extraocular Movements: Extraocular movements intact.      Conjunctiva/sclera: Conjunctivae normal.   Pulmonary:      Effort: Pulmonary effort is normal. No respiratory distress.   Genitourinary:      Skin:     General: Skin is warm and dry.       Findings: Abrasion and rash present.      Comments: Glans penis is wrinkled and raw (consistent with skin breakdown from moisture). No lesions present.    Neurological:      General: No focal deficit present.      Mental Status: He is alert.   Psychiatric:         Mood and Affect: Mood normal.         Behavior: Behavior normal.         Thought Content: Thought content normal.         Judgment: Judgment normal.

## 2024-07-08 ENCOUNTER — OFFICE VISIT (OUTPATIENT)
Dept: URGENT CARE | Facility: CLINIC | Age: 29
End: 2024-07-08
Payer: COMMERCIAL

## 2024-07-08 VITALS
SYSTOLIC BLOOD PRESSURE: 136 MMHG | OXYGEN SATURATION: 98 % | DIASTOLIC BLOOD PRESSURE: 94 MMHG | BODY MASS INDEX: 36.32 KG/M2 | HEART RATE: 133 BPM | TEMPERATURE: 97.2 F | HEIGHT: 69 IN | WEIGHT: 245.2 LBS

## 2024-07-08 DIAGNOSIS — R21 PENILE RASH: Primary | ICD-10-CM

## 2024-07-08 PROCEDURE — 99214 OFFICE O/P EST MOD 30 MIN: CPT | Performed by: PHYSICIAN ASSISTANT

## 2024-07-08 PROCEDURE — 87529 HSV DNA AMP PROBE: CPT | Performed by: PHYSICIAN ASSISTANT

## 2024-07-08 RX ORDER — VALACYCLOVIR HYDROCHLORIDE 1 G/1
1000 TABLET, FILM COATED ORAL 3 TIMES DAILY
Qty: 21 TABLET | Refills: 0 | Status: SHIPPED | OUTPATIENT
Start: 2024-07-08 | End: 2024-07-15

## 2024-07-08 NOTE — PROGRESS NOTES
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 Penile rash  HSV TYPE 1,2 DNA PCR [facility] ONLY    HSV TYPE 1,2 DNA PCR [facility] ONLY    valACYclovir (VALTREX) 1,000 mg tablet        Suspect HSV infection.  Recommending patient follow-up with dermatology if HSV swabs are negative.    Patient Instructions   There are no Patient Instructions on file for this visit.      Follow up with PCP in 3-5 days.  Proceed to  ER if symptoms worsen.    Chief Complaint     Chief Complaint   Patient presents with    Rash     Penis         History of Present Illness       The pt is a 28-year-old M presenting today for a penile rash x 2 weeks.  He was seen here on 2024 and told to use Lamisil cream.  Denies concern for STIs.  Is sexually active with his fiancée.  He admits to pain but denies any itching.  Denies any fevers or chills.  Denies any night sweats or unexpected weight loss.  Denies any urinary symptoms.        Review of Systems   Review of Systems   Skin:  Positive for rash.         Current Medications       Current Outpatient Medications:     valACYclovir (VALTREX) 1,000 mg tablet, Take 1 tablet (1,000 mg total) by mouth 3 (three) times a day for 7 days, Disp: 21 tablet, Rfl: 0    fluticasone (Arnuity Ellipta) 100 MCG/ACT AEPB inhaler, Daily at night, Disp: , Rfl:     fluticasone (FLONASE) 50 mcg/act nasal spray, 1 spray into each nostril daily, Disp: , Rfl:     guanFACINE (TENEX) 1 mg tablet, Take 1 tablet (1 mg total) by mouth daily at bedtime, Disp: , Rfl:     lamoTRIgine  MG TB24, Take 300 mg by mouth daily, Disp: , Rfl:     mupirocin (BACTROBAN) 2 % ointment, Apply topically 3 (three) times a day, Disp: 22 g, Rfl: 0    terbinafine (LamISIL) 1 % cream, Apply topically daily, Disp: 42 g, Rfl: 0    valACYclovir (VALTREX) 1,000 mg tablet, TAKE 2 TABLETS AT ONSET OF  "HERPES OUTBREAK, THEN TAKE ANOTHER 2 TABLETS IN 12 HOURS., Disp: 360 tablet, Rfl: 1    Current Allergies     Allergies as of 07/08/2024 - Reviewed 07/08/2024   Allergen Reaction Noted    Cefaclor Hives 10/13/2020    Erythromycin Other (See Comments) 10/13/2020            The following portions of the patient's history were reviewed and updated as appropriate: allergies, current medications, past family history, past medical history, past social history, past surgical history and problem list.     Past Medical History:   Diagnosis Date    ADHD (attention deficit hyperactivity disorder)     Asthma        History reviewed. No pertinent surgical history.    History reviewed. No pertinent family history.      Medications have been verified.        Objective   /94   Pulse (!) 133   Temp (!) 97.2 °F (36.2 °C)   Ht 5' 9\" (1.753 m)   Wt 111 kg (245 lb 3.2 oz)   SpO2 98%   BMI 36.21 kg/m²        Physical Exam     Physical Exam  Vitals and nursing note reviewed. Exam conducted with a chaperone present.   Constitutional:       General: He is not in acute distress.     Appearance: Normal appearance. He is normal weight. He is not ill-appearing, toxic-appearing or diaphoretic.   Cardiovascular:      Rate and Rhythm: Normal rate.   Pulmonary:      Effort: Pulmonary effort is normal.   Genitourinary:     Pubic Area: Rash present.       Skin:     General: Skin is warm.   Neurological:      Mental Status: He is alert.                   " Other (Free Text): Ipledge booklet given to patient/father to review at first visit. Patient happy with improvement today and declines isotretinoin Detail Level: Zone Note Text (......Xxx Chief Complaint.): This diagnosis correlates with the

## 2024-07-09 LAB
HSV1 DNA SPEC QL NAA+PROBE: NOT DETECTED
HSV1 DNA SPEC QL NAA+PROBE: NOT DETECTED

## 2024-07-18 PROBLEM — J30.1 SEASONAL ALLERGIC RHINITIS DUE TO POLLEN: Status: ACTIVE | Noted: 2024-07-18

## 2024-07-26 ENCOUNTER — HOSPITAL ENCOUNTER (EMERGENCY)
Facility: HOSPITAL | Age: 29
Discharge: HOME | End: 2024-07-26
Attending: EMERGENCY MEDICINE
Payer: COMMERCIAL

## 2024-07-26 VITALS
HEART RATE: 113 BPM | OXYGEN SATURATION: 96 % | TEMPERATURE: 98.9 F | RESPIRATION RATE: 18 BRPM | SYSTOLIC BLOOD PRESSURE: 142 MMHG | DIASTOLIC BLOOD PRESSURE: 88 MMHG

## 2024-07-26 DIAGNOSIS — R56.9 SEIZURE (HCC): Primary | ICD-10-CM

## 2024-07-26 LAB
ALBUMIN SERPL BCG-MCNC: 4.7 G/DL (ref 3.5–5)
ALP SERPL-CCNC: 70 U/L (ref 34–104)
ALT SERPL W P-5'-P-CCNC: 37 U/L (ref 7–52)
ANION GAP SERPL CALCULATED.3IONS-SCNC: 11 MMOL/L (ref 4–13)
AST SERPL W P-5'-P-CCNC: 20 U/L (ref 13–39)
BASOPHILS # BLD AUTO: 0.04 THOUSANDS/ÂΜL (ref 0–0.1)
BASOPHILS NFR BLD AUTO: 1 % (ref 0–1)
BILIRUB SERPL-MCNC: 0.48 MG/DL (ref 0.2–1)
BUN SERPL-MCNC: 12 MG/DL (ref 5–25)
CALCIUM SERPL-MCNC: 10.2 MG/DL (ref 8.4–10.2)
CHLORIDE SERPL-SCNC: 102 MMOL/L (ref 96–108)
CO2 SERPL-SCNC: 22 MMOL/L (ref 21–32)
CREAT SERPL-MCNC: 1 MG/DL (ref 0.6–1.3)
EOSINOPHIL # BLD AUTO: 0.33 THOUSAND/ÂΜL (ref 0–0.61)
EOSINOPHIL NFR BLD AUTO: 4 % (ref 0–6)
ERYTHROCYTE [DISTWIDTH] IN BLOOD BY AUTOMATED COUNT: 13 % (ref 11.6–15.1)
GFR SERPL CREATININE-BSD FRML MDRD: 100 ML/MIN/1.73SQ M
GLUCOSE SERPL-MCNC: 80 MG/DL (ref 65–140)
HCT VFR BLD AUTO: 51.7 % (ref 36.5–49.3)
HGB BLD-MCNC: 17.8 G/DL (ref 12–17)
IMM GRANULOCYTES # BLD AUTO: 0.04 THOUSAND/UL (ref 0–0.2)
IMM GRANULOCYTES NFR BLD AUTO: 1 % (ref 0–2)
LIPASE SERPL-CCNC: 20 U/L (ref 11–82)
LYMPHOCYTES # BLD AUTO: 2.05 THOUSANDS/ÂΜL (ref 0.6–4.47)
LYMPHOCYTES NFR BLD AUTO: 26 % (ref 14–44)
MCH RBC QN AUTO: 31.1 PG (ref 26.8–34.3)
MCHC RBC AUTO-ENTMCNC: 34.4 G/DL (ref 31.4–37.4)
MCV RBC AUTO: 90 FL (ref 82–98)
MONOCYTES # BLD AUTO: 0.57 THOUSAND/ÂΜL (ref 0.17–1.22)
MONOCYTES NFR BLD AUTO: 7 % (ref 4–12)
NEUTROPHILS # BLD AUTO: 4.92 THOUSANDS/ÂΜL (ref 1.85–7.62)
NEUTS SEG NFR BLD AUTO: 61 % (ref 43–75)
NRBC BLD AUTO-RTO: 0 /100 WBCS
PLATELET # BLD AUTO: 299 THOUSANDS/UL (ref 149–390)
PMV BLD AUTO: 9.4 FL (ref 8.9–12.7)
POTASSIUM SERPL-SCNC: 4.5 MMOL/L (ref 3.5–5.3)
PROT SERPL-MCNC: 8.3 G/DL (ref 6.4–8.4)
RBC # BLD AUTO: 5.72 MILLION/UL (ref 3.88–5.62)
SODIUM SERPL-SCNC: 135 MMOL/L (ref 135–147)
WBC # BLD AUTO: 7.95 THOUSAND/UL (ref 4.31–10.16)

## 2024-07-26 PROCEDURE — 85025 COMPLETE CBC W/AUTO DIFF WBC: CPT | Performed by: EMERGENCY MEDICINE

## 2024-07-26 PROCEDURE — 80175 DRUG SCREEN QUAN LAMOTRIGINE: CPT | Performed by: EMERGENCY MEDICINE

## 2024-07-26 PROCEDURE — 99284 EMERGENCY DEPT VISIT MOD MDM: CPT

## 2024-07-26 PROCEDURE — 99285 EMERGENCY DEPT VISIT HI MDM: CPT | Performed by: EMERGENCY MEDICINE

## 2024-07-26 PROCEDURE — 80053 COMPREHEN METABOLIC PANEL: CPT | Performed by: EMERGENCY MEDICINE

## 2024-07-26 PROCEDURE — 93005 ELECTROCARDIOGRAM TRACING: CPT

## 2024-07-26 PROCEDURE — 83690 ASSAY OF LIPASE: CPT | Performed by: EMERGENCY MEDICINE

## 2024-07-26 PROCEDURE — 96360 HYDRATION IV INFUSION INIT: CPT

## 2024-07-26 PROCEDURE — 36415 COLL VENOUS BLD VENIPUNCTURE: CPT | Performed by: EMERGENCY MEDICINE

## 2024-07-26 RX ADMIN — SODIUM CHLORIDE 1000 ML: 0.9 INJECTION, SOLUTION INTRAVENOUS at 14:14

## 2024-07-26 NOTE — ED PROVIDER NOTES
History  Chief Complaint   Patient presents with    Seizure - Prior Hx Of     A seizure that lasted around a minute. Last seizure 16 years ago. On lamictal. Presents with bruising to right side of face.     Patient brought in by EMS from home for evaluation of a seizure.  Patient states he was sitting in his chair woke up in the same chair with his parents by him saying he had a seizure lasting approximately 1 minute.  He had 1 seizure approximately 16 years ago.  He is on Lamictal.  Denies any missing doses.  He was sick with COVID this past week but is since recovered.  Denies any headache at this time.      History provided by:  Patient and EMS personnel   used: No    Seizure - Prior Hx Of      Prior to Admission Medications   Prescriptions Last Dose Informant Patient Reported? Taking?   Breo Ellipta 100-25 MCG/ACT inhaler   No No   Sig: Inhale 1 puff daily Rinse mouth after use.   Spacer/Aero-Holding Chambers (Vortex Valved Holding Chamber) VOLODYMYR   No No   Sig: Use 2 (two) times a day   fluticasone (FLONASE) 50 mcg/act nasal spray   No No   Si spray into each nostril daily   guanFACINE (TENEX) 1 mg tablet   No No   Sig: Take 1 tablet (1 mg total) by mouth daily at bedtime   lamoTRIgine  MG TB24   Yes No   Sig: Take 300 mg by mouth daily   levalbuterol (Xopenex HFA) 45 mcg/act inhaler   No No   Sig: Inhale 1-2 puffs every 4 (four) hours as needed for wheezing   mupirocin (BACTROBAN) 2 % ointment   No No   Sig: Apply topically 3 (three) times a day   terbinafine (LamISIL) 1 % cream   No No   Sig: Apply topically daily   valACYclovir (VALTREX) 1,000 mg tablet   No No   Sig: TAKE 2 TABLETS AT ONSET OF HERPES OUTBREAK, THEN TAKE ANOTHER 2 TABLETS IN 12 HOURS.   valACYclovir (VALTREX) 1,000 mg tablet   No No   Sig: Take 1 tablet (1,000 mg total) by mouth 3 (three) times a day for 7 days      Facility-Administered Medications: None       Past Medical History:   Diagnosis Date    ADHD  (attention deficit hyperactivity disorder)     Asthma        History reviewed. No pertinent surgical history.    Family History   Problem Relation Age of Onset    No Known Problems Mother     No Known Problems Father      I have reviewed and agree with the history as documented.    E-Cigarette/Vaping    E-Cigarette Use Never User      E-Cigarette/Vaping Substances    Nicotine No     THC No     CBD No     Flavoring No     Other No     Unknown No      Social History     Tobacco Use    Smoking status: Never    Smokeless tobacco: Never   Vaping Use    Vaping status: Never Used   Substance Use Topics    Alcohol use: Not Currently    Drug use: Never     Comment: rare       Review of Systems   All other systems reviewed and are negative.      Physical Exam  Physical Exam  Vitals and nursing note reviewed.   Constitutional:       General: He is not in acute distress.  Eyes:      General: No scleral icterus.     Extraocular Movements: Extraocular movements intact.      Conjunctiva/sclera: Conjunctivae normal.      Pupils: Pupils are equal, round, and reactive to light.   Cardiovascular:      Rate and Rhythm: Regular rhythm. Tachycardia present.   Pulmonary:      Effort: Pulmonary effort is normal. No respiratory distress.      Breath sounds: Normal breath sounds.   Abdominal:      Tenderness: There is no abdominal tenderness.   Musculoskeletal:         General: No deformity. Normal range of motion.   Neurological:      General: No focal deficit present.      Mental Status: He is alert and oriented to person, place, and time.      Cranial Nerves: No cranial nerve deficit.      Sensory: No sensory deficit.      Motor: No weakness.      Coordination: Coordination normal.         Vital Signs  ED Triage Vitals   Temperature Pulse Respirations Blood Pressure SpO2   07/26/24 1405 07/26/24 1403 07/26/24 1403 07/26/24 1403 07/26/24 1403   98.9 °F (37.2 °C) (!) 137 18 155/89 95 %      Temp Source Heart Rate Source Patient Position -  Orthostatic VS BP Location FiO2 (%)   07/26/24 1405 07/26/24 1403 07/26/24 1403 07/26/24 1403 --   Oral Monitor Lying Right arm       Pain Score       --                  Vitals:    07/26/24 1415 07/26/24 1445 07/26/24 1500 07/26/24 1515   BP: 151/85 132/90 140/88 142/88   Pulse: (!) 126 (!) 112 (!) 112 (!) 113   Patient Position - Orthostatic VS:             Visual Acuity      ED Medications  Medications   sodium chloride 0.9 % bolus 1,000 mL (0 mL Intravenous Stopped 7/26/24 1514)       Diagnostic Studies  Results Reviewed       Procedure Component Value Units Date/Time    Lamotrigine level [[id number]] Collected: 07/26/24 1529    Lab Status: In process Specimen: Blood from Arm, Left Updated: 07/26/24 1539    Comprehensive metabolic panel [[id number]] Collected: 07/26/24 1413    Lab Status: Final result Specimen: Blood from Arm, Left Updated: 07/26/24 1441     Sodium 135 mmol/L      Potassium 4.5 mmol/L      Chloride 102 mmol/L      CO2 22 mmol/L      ANION GAP 11 mmol/L      BUN 12 mg/dL      Creatinine 1.00 mg/dL      Glucose 80 mg/dL      Calcium 10.2 mg/dL      AST 20 U/L      ALT 37 U/L      Alkaline Phosphatase 70 U/L      Total Protein 8.3 g/dL      Albumin 4.7 g/dL      Total Bilirubin 0.48 mg/dL      eGFR 100 ml/min/1.73sq m     Narrative:      National Kidney Disease Foundation guidelines for Chronic Kidney Disease (CKD):     Stage 1 with normal or high GFR (GFR > 90 mL/min/1.73 square meters)    Stage 2 Mild CKD (GFR = 60-89 mL/min/1.73 square meters)    Stage 3A Moderate CKD (GFR = 45-59 mL/min/1.73 square meters)    Stage 3B Moderate CKD (GFR = 30-44 mL/min/1.73 square meters)    Stage 4 Severe CKD (GFR = 15-29 mL/min/1.73 square meters)    Stage 5 End Stage CKD (GFR <15 mL/min/1.73 square meters)  Note: GFR calculation is accurate only with a steady state creatinine    Lipase [[id number]]  (Normal) Collected: 07/26/24 1413    Lab Status: Final result Specimen: Blood from Arm, Left Updated: 07/26/24 6044      Lipase 20 u/L     CBC and differential [[id number]]  (Abnormal) Collected: 07/26/24 1413    Lab Status: Final result Specimen: Blood from Arm, Left Updated: 07/26/24 1421     WBC 7.95 Thousand/uL      RBC 5.72 Million/uL      Hemoglobin 17.8 g/dL      Hematocrit 51.7 %      MCV 90 fL      MCH 31.1 pg      MCHC 34.4 g/dL      RDW 13.0 %      MPV 9.4 fL      Platelets 299 Thousands/uL      nRBC 0 /100 WBCs      Segmented % 61 %      Immature Grans % 1 %      Lymphocytes % 26 %      Monocytes % 7 %      Eosinophils Relative 4 %      Basophils Relative 1 %      Absolute Neutrophils 4.92 Thousands/µL      Absolute Immature Grans 0.04 Thousand/uL      Absolute Lymphocytes 2.05 Thousands/µL      Absolute Monocytes 0.57 Thousand/µL      Eosinophils Absolute 0.33 Thousand/µL      Basophils Absolute 0.04 Thousands/µL                    No orders to display              Procedures  ECG 12 Lead Documentation Only    Date/Time: 7/26/2024 3:06 PM    Performed by: Andre Colbert DO  Authorized by: Andre Colbert DO    ECG reviewed by me, the ED Provider: yes    Patient location:  ED  Interpretation:     Interpretation: non-specific    Rate:     ECG rate:  118    ECG rate assessment: tachycardic    Rhythm:     Rhythm: sinus rhythm    Ectopy:     Ectopy: none    ST segments:     ST segments:  Normal  T waves:     T waves: non-specific             ED Course  ED Course as of 07/26/24 2241 Fri Jul 26, 2024   1512 Medical review form for [facility] filled out and emailed to [facility]. Patient instructed not to drive until cleared by neurology. Patient verbalized understanding of these instructions. Patient will follow up with his neurologist Dr. Ashley as an outpatient.                                                Medical Decision Making  Pulse ox 95% on room air indicating adequate oxygenation.    Amount and/or Complexity of Data Reviewed  Labs: ordered.  ECG/medicine tests: ordered and independent interpretation performed.                  Disposition  Final diagnoses:   Seizure (HCC)     Time reflects when diagnosis was documented in both MDM as applicable and the Disposition within this note       Time User Action Codes Description Comment    7/26/2024  3:44 PM Andre Colbert Add [R56.9] Seizure (HCC)           ED Disposition       ED Disposition   Discharge    Condition   Stable    Date/Time   Fri Jul 26, 2024  3:44 PM    Comment   Pastor Tate discharge to home/self care.                   Follow-up Information       Follow up With Specialties Details Why Contact Info Additional Information    Stanley Ashley MD Neurology In 1 week  [facility]  [address]  [address]  [facility] [place] [address] [phone]       Replaced by [facility] Emergency Medicine  If symptoms worsen [address] [id number] [phone] [facility] Emergency Department, [address], [place], [address]            Discharge Medication List as of 7/26/2024  3:44 PM        CONTINUE these medications which have NOT CHANGED    Details   Breo Ellipta 100-25 MCG/ACT inhaler Inhale 1 puff daily Rinse mouth after use., Starting Thu 7/18/2024, Normal      fluticasone (FLONASE) 50 mcg/act nasal spray 1 spray into each nostril daily, Starting Thu 7/18/2024, Normal      guanFACINE (TENEX) 1 mg tablet Take 1 tablet (1 mg total) by mouth daily at bedtime, Starting Tue 3/7/2023, No Print      lamoTRIgine  MG TB24 Take 300 mg by mouth daily, Starting Tue 10/13/2020, Historical Med      levalbuterol (Xopenex HFA) 45 mcg/act inhaler Inhale 1-2 puffs every 4 (four) hours as needed for wheezing, Starting Thu 7/18/2024, Normal      mupirocin (BACTROBAN) 2 % ointment Apply topically 3 (three) times a day, Starting Mon 8/22/2022, Normal      Spacer/Aero-Holding Chambers (Vortex Valved Holding Chamber) VOLODYMYR Use 2 (two) times a day, Starting Thu 7/18/2024, Normal       terbinafine (LamISIL) 1 % cream Apply topically daily, Starting Tue 6/25/2024, Normal      valACYclovir (VALTREX) 1,000 mg tablet TAKE 2 TABLETS AT ONSET OF HERPES OUTBREAK, THEN TAKE ANOTHER 2 TABLETS IN 12 HOURS., Normal      valACYclovir (VALTREX) 1,000 mg tablet Take 1 tablet (1,000 mg total) by mouth 3 (three) times a day for 7 days, Starting Mon 7/8/2024, Until Mon 7/15/2024, Normal             No discharge procedures on file.    PDMP Review       None            ED Provider  Electronically Signed by             Andre Colbert DO  07/26/24 4093

## 2024-07-29 LAB
ATRIAL RATE: 118 BPM
P AXIS: 47 DEGREES
PR INTERVAL: 134 MS
QRS AXIS: 46 DEGREES
QRSD INTERVAL: 80 MS
QT INTERVAL: 304 MS
QTC INTERVAL: 426 MS
T WAVE AXIS: 9 DEGREES
VENTRICULAR RATE: 118 BPM

## 2024-07-29 PROCEDURE — 93010 ELECTROCARDIOGRAM REPORT: CPT | Performed by: INTERNAL MEDICINE

## 2024-07-30 LAB — LAMOTRIGINE SERPL-MCNC: 3 UG/ML (ref 2–20)

## 2024-09-05 ENCOUNTER — OFFICE VISIT (OUTPATIENT)
Dept: FAMILY MEDICINE CLINIC | Facility: CLINIC | Age: 29
End: 2024-09-05
Payer: COMMERCIAL

## 2024-09-05 VITALS
RESPIRATION RATE: 18 BRPM | WEIGHT: 252 LBS | HEART RATE: 87 BPM | BODY MASS INDEX: 37.33 KG/M2 | SYSTOLIC BLOOD PRESSURE: 132 MMHG | HEIGHT: 69 IN | DIASTOLIC BLOOD PRESSURE: 82 MMHG | TEMPERATURE: 96.8 F

## 2024-09-05 DIAGNOSIS — F95.9 TIC DISORDER: ICD-10-CM

## 2024-09-05 DIAGNOSIS — G40.909 SEIZURE DISORDER (HCC): Primary | ICD-10-CM

## 2024-09-05 PROCEDURE — 3725F SCREEN DEPRESSION PERFORMED: CPT | Performed by: FAMILY MEDICINE

## 2024-09-05 PROCEDURE — 99214 OFFICE O/P EST MOD 30 MIN: CPT | Performed by: FAMILY MEDICINE

## 2024-09-05 NOTE — PROGRESS NOTES
"Ambulatory Visit  Name: Pastor Tate      : 1995      MRN: [id number]  Encounter Provider: Yun Dan MD  Encounter Date: 2024   Encounter department: [facility]    Assessment & Plan   1. Seizure disorder (HCC)  2. Tic disorder  Follows neurologist through [facility]. Last seizure was 2024 so he is not currently cleared to return to driving per neurology. His neurologist did fill out form stating this, but pt states [employer] had a form that needed to be filled out by me as well stating the same thing. Form completed today. He will continue further management of seizure disorder through his neurologist.      History of Present Illness     HPI  Emanuel is here today for paperwork for [employer].   He has a history of seizure disorder and his last seizure was at the end of July.   He follows neurologist through [facility] who did not clear him to drive, but there is a form that also needs to be filled out by PCP stating the same.   He reports no acute issues/concerns.   Currently on lamictal for seizures which he has been on since his first seizure around 15 years of age.   Review of Systems   Constitutional: Negative.    HENT: Negative.     Eyes: Negative.    Respiratory: Negative.     Cardiovascular: Negative.    Gastrointestinal: Negative.    Endocrine: Negative.    Genitourinary: Negative.    Musculoskeletal: Negative.    Neurological: Negative.    Hematological: Negative.    Psychiatric/Behavioral: Negative.         Objective     /82   Pulse 87   Temp (!) 96.8 °F (36 °C)   Resp 18   Ht 5' 9\" (1.753 m)   Wt 114 kg (252 lb)   BMI 37.21 kg/m²     Physical Exam  Vitals and nursing note reviewed.   Constitutional:       General: He is not in acute distress.     Appearance: Normal appearance. He is well-developed and normal weight. He is not ill-appearing, toxic-appearing or diaphoretic.   HENT:      Head: Normocephalic and atraumatic.      Right Ear: Tympanic membrane, ear canal and external " ear normal. There is no impacted cerumen.      Left Ear: Tympanic membrane, ear canal and external ear normal. There is no impacted cerumen.      Nose: Nose normal.      Mouth/Throat:      Mouth: Mucous membranes are moist.      Pharynx: Oropharynx is clear. No oropharyngeal exudate or posterior oropharyngeal erythema.   Eyes:      General: No scleral icterus.        Right eye: No discharge.         Left eye: No discharge.      Extraocular Movements: Extraocular movements intact.      Conjunctiva/sclera: Conjunctivae normal.      Pupils: Pupils are equal, round, and reactive to light.   Cardiovascular:      Rate and Rhythm: Normal rate and regular rhythm.      Pulses: Normal pulses.      Heart sounds: Normal heart sounds. No murmur heard.     No friction rub. No gallop.   Pulmonary:      Effort: Pulmonary effort is normal. No respiratory distress.      Breath sounds: Normal breath sounds. No stridor. No wheezing, rhonchi or rales.   Chest:      Chest wall: No tenderness.   Abdominal:      General: Abdomen is flat. Bowel sounds are normal. There is no distension.      Palpations: Abdomen is soft. There is no mass.      Tenderness: There is no abdominal tenderness. There is no guarding or rebound.      Hernia: No hernia is present.   Musculoskeletal:         General: No swelling. Normal range of motion.      Cervical back: Neck supple.   Skin:     General: Skin is warm and dry.      Capillary Refill: Capillary refill takes less than 2 seconds.   Neurological:      General: No focal deficit present.      Mental Status: He is alert and oriented to person, place, and time. Mental status is at baseline.      Motor: No weakness.      Gait: Gait normal.   Psychiatric:         Mood and Affect: Mood normal.         Behavior: Behavior normal.         Thought Content: Thought content normal.         Judgment: Judgment normal.       Administrative Statements

## 2024-10-22 ENCOUNTER — TELEPHONE (OUTPATIENT)
Dept: NEUROLOGY | Facility: CLINIC | Age: 29
End: 2024-10-22

## 2024-10-22 NOTE — TELEPHONE ENCOUNTER
I called patient spoke with his father and scheduled I'm in [place] with Dr. Gallardo. Patient father confirmed date and time and location. Also made aware to follow provider.

## 2024-10-23 ENCOUNTER — OFFICE VISIT (OUTPATIENT)
Dept: NEUROLOGY | Facility: CLINIC | Age: 29
End: 2024-10-23
Payer: COMMERCIAL

## 2024-10-23 VITALS
SYSTOLIC BLOOD PRESSURE: 130 MMHG | DIASTOLIC BLOOD PRESSURE: 90 MMHG | HEIGHT: 69 IN | BODY MASS INDEX: 38.06 KG/M2 | HEART RATE: 108 BPM | WEIGHT: 257 LBS

## 2024-10-23 DIAGNOSIS — G40.409 MYOCLONIC EPILEPSY (HCC): Primary | ICD-10-CM

## 2024-10-23 PROCEDURE — 99205 OFFICE O/P NEW HI 60 MIN: CPT

## 2024-10-23 NOTE — PROGRESS NOTES
"Ambulatory Visit  Name: Pastor Tate      : 1995      MRN: [id number]  Encounter Provider: Darryn Gallardo MD  Encounter Date: 10/23/2024   Encounter department: [facility] [place]    Assessment & Plan  Myoclonic epilepsy (HCC)    Orders:    MRI brain with and without contrast; Future    EEG Routine and awake; Future    Patient is a 28 year old right handed male with PMH of myoclonic epilepsy who presents to establish care for seizures. Seizures started at age 15, when he was started on lamotrigine 300mg XR. He remained seizure free for 13 year until  when he had a break through seizure in the setting of an acute fever. He followed up with Dr. Ashley's office in [facility] - told to continue continue lamotrigine 300mg XR.    I agree that patient likely had a break through seizure in the setting of acute illness. He has stopped driving and will contact my office if he has any more seizures or spasms/jerks. We will continue on lamotrigine 300mg XR.    - Continue lamotrigine 300mg XR - patient has enough refills already. Told to call the office if they run out of medications.   - Restricted from driving for 6 months after last seizure  - Ordered routine EEG and MRI Brain with and without contrast  - Follow up in 2025 or sooner if he is having seizures       History of Present Illness   HPI    Emanuel Tate is a 28 year old right handed male with PMH of myoclonic epilepsy who presents to establish care for seizures.     He has had seizures since . He has only had two lifetime seizures. First seizure at age 15 when he was on his way to school. His mother was driving him and she then witnessed patient's body \"tense up\" and \"pass out\". He lost consciousness and did not remember the event. The event lasted 2 minutes. Denies urinary incontince, denies tongue bite. He was then referred to a neurologist in [place], [place] where he got an ambulatory EEG and was diagnosed " "with myoclonic epilepsy. He got an ambulatory EEG - was told he had a myoclonic epilepsy. He was started on Lamictal at the time. Upon further questioning, mother reveals that a few months prior to his first seizure, he would have episodes of arm jerking, lasting about 1-2 seconds. These jerks would cause him to drop objects.     Patient remained seizure free on lamotrigine 300mg XR until 7/26/2024. At the time of seizure, patient had a fever of 103 F from a COVID infection. He was witnessed to \"tense up\" and fall out of his chair. He lost consciousness and started grunting. He bit his tongue, but denies urinary or fecal incontinence. After his seizure, he was tired and confused.     Seizures were controlled with lamotringe 300mg ER. Last seizure was on in the setting of acute fever (103 F) due to COVID. Parents heard a thud and saw him making a \"humming sound\". Denies sleep deprivation or loss of appetite. Durign seizure, he fell out of his chair and was seen to be \"tensing up\" and grunting. He lost conciousness. He had a tongue bite, denies incontinence.     He used to see Dr. Ashley at [facility]. He saw Dr. Ashley after his most recent seizure and he was told continue lamotrigine 300mg XR because the seizure was though to be provoked in the setting of an acute illness. After his breakthrough seizure, his lamictal level was 2.0.    He does not have any MRI Brain or EEG recently.    Family hx:  - Denies any family history of epilepsy or neurological disorders    Event/Seizure semiology:  Event type 1: Generalized tonic clonic  - Frequency/Date of last event: 7/26/2024  - Warning/Aura: no  - Loss of awareness: yes -   - Amnestic to the event: yes -   - Semiology: whole body tonic activity  - Presence of post-ictal period: yes - fatigued and confused   - Sonorus breathing: no  - Incontinence: no  - Tongue biting: yes -     Event type 2: Myoclonic jerks  - Frequency/Date of last event: age 15  - Warning/Aura: no  - Loss of " awareness: no  - Amnestic to the event: no  - Semiology: arm jerking  - Presence of post-ictal period: no   - Sonorus breathing: no  - Incontinence: no  - Tongue biting: no    Special Features  Age/Date of seizure onset: Age 15  Status epilepticus: no  Self Injury Seizures: no  Precipitating Factors:  none  Longest seizure free interval: 13 years    Epilepsy Risk Factors:  Abnormal development - walked at 20 months    Current AEDs:  Lamotrigine 300mg XR    Medication side effects: None  Medication adherence: Yes    Prior AEDs:  None    Prior Evaluation:  - routine EEG: no  - cEEG/EMU: no  - MRI brain: no  - PET: no  - fMRI: no  - Ictal SPECT: no  - Neuropsych evaluation: no      History Reviewed:   The following were reviewed and updated as appropriate: allergies, current medications, past family history, past medical history, past social history, past surgical history, and problem list     Psychiatric History:  None    Social History:   Driving: No - stopped driving after last seizure in July 2024  Lives Alone: No - lives with parents and brother  Occupation: He works as a   - Denies tobacco smoking, denies drug use, denies alcohol use.      Review of Systems   Constitutional:  Negative for appetite change, fatigue and fever.   HENT: Negative.  Negative for hearing loss, tinnitus, trouble swallowing and voice change.    Eyes: Negative.  Negative for photophobia, pain and visual disturbance.   Respiratory: Negative.  Negative for shortness of breath.    Cardiovascular: Negative.  Negative for palpitations.   Gastrointestinal: Negative.  Negative for nausea and vomiting.   Endocrine: Negative.  Negative for cold intolerance.   Genitourinary: Negative.  Negative for dysuria, frequency and urgency.   Musculoskeletal:  Negative for back pain, gait problem, myalgias, neck pain and neck stiffness.   Skin: Negative.  Negative for rash.   Allergic/Immunologic: Negative.    Neurological:  Positive for seizures.  "Negative for dizziness, tremors, syncope, facial asymmetry, speech difficulty, weakness, light-headedness, numbness and headaches.        Patient stated that his last seizure was 2 months ago that last for 3 mins.    Hematological: Negative.  Does not bruise/bleed easily.   Psychiatric/Behavioral: Negative.  Negative for confusion, hallucinations and sleep disturbance.      I have personally reviewed the MA's review of systems and made changes as necessary.      Objective     /90 (BP Location: Left arm, Patient Position: Sitting, Cuff Size: Adult)   Pulse (!) 108   Ht 5' 9\" (1.753 m)   Wt 117 kg (257 lb)   BMI 37.95 kg/m²     Physical Exam  Neurologic Exam  /90 (BP Location: Left arm, Patient Position: Sitting, Cuff Size: Adult)   Pulse (!) 108   Ht 5' 9\" (1.753 m)   Wt 117 kg (257 lb)   BMI 37.95 kg/m²      General Exam  General: well developed, no acute distress.  HEENT: mucous membranes moist, anicteric sclera.   Neck: supple, good ROM.      Neurological Exam  Mental Status: awake, alert, and fully oriented to person, place, time, and situation.  Fund of knowledge is appropriate for age and education.  There is no neglect.    Language: fluency, and comprehension normal.       Cranial Nerves: Pupils equal and reactive to light.  Visual fields full to confrontation. Extraocular motions intact with full versions, normal pursuits and saccades. Facial strength full and symmetric. Facial sensation intact in V1-V3. Hearing intact to voice. Tongue protrudes to midline. Palate elevates symmetrically. Speech clear without notable dysarthria.     Motor: Normal bulk and tone. No pronator drift. Strength is 5/5 proximally and distally in all 4 extremities. No involuntary movements.    Sensory: Sensation intact to light touch distally in all extremities.    Coordination: Normal finger-to-nose. Normal rapid alternating movements.     Station and gait: Casual and tandem gait normal. Normal " Romberg.    Reflexes: Reflexes 1+ throughout and symmetric. Both toes down going.    Results/Data:          Administrative Statements   I have spent a total time of 60 minutes in caring for this patient on the day of the visit/encounter including Diagnostic results, Prognosis, Risks and benefits of tx options, Instructions for management, Patient and family education, Importance of tx compliance, Counseling / Coordination of care, Documenting in the medical record, and Obtaining or reviewing history  .

## 2024-11-13 ENCOUNTER — HOSPITAL ENCOUNTER (OUTPATIENT)
Dept: NEUROLOGY | Facility: HOSPITAL | Age: 29
Discharge: HOME | End: 2024-11-13
Payer: COMMERCIAL

## 2024-11-13 DIAGNOSIS — G40.409 MYOCLONIC EPILEPSY (HCC): ICD-10-CM

## 2024-11-13 PROCEDURE — 95816 EEG AWAKE AND DROWSY: CPT

## 2024-11-17 ENCOUNTER — HOSPITAL ENCOUNTER (OUTPATIENT)
Dept: RADIOLOGY | Facility: HOSPITAL | Age: 29
Discharge: HOME | End: 2024-11-17
Payer: COMMERCIAL

## 2024-11-17 DIAGNOSIS — G40.409 MYOCLONIC EPILEPSY (HCC): ICD-10-CM

## 2024-11-17 PROCEDURE — 70553 MRI BRAIN STEM W/O & W/DYE: CPT

## 2024-11-17 PROCEDURE — A9585 GADOBUTROL INJECTION: HCPCS

## 2024-11-17 RX ORDER — GADOBUTROL 604.72 MG/ML
12 INJECTION INTRAVENOUS
Status: COMPLETED | OUTPATIENT
Start: 2024-11-17 | End: 2024-11-17

## 2024-11-17 RX ADMIN — GADOBUTROL 12 ML: 604.72 INJECTION INTRAVENOUS at 13:13

## 2024-11-18 PROCEDURE — 95816 EEG AWAKE AND DROWSY: CPT

## 2024-11-19 ENCOUNTER — TELEPHONE (OUTPATIENT)
Age: 29
End: 2024-11-19

## 2024-11-19 NOTE — TELEPHONE ENCOUNTER
Inbound call received from Junie, from the [facility] Radiology Reading Room.     Stated significant findings on patient's MRI Brain Seizure 11/17/24    IMPRESSION:  1. No acute infarct, mass effect or midline shift.  2. Symmetric hippocampal formations with regard to signal intensity and size.  3. Redemonstrated expansion of the left sphenoid sinus with heterogeneous nonspecific signal intensity material, possibly representing inspissated secretions though a mass lesion is not excluded. Recommend correlation with direct visualization, as   clinically appropriate.      Secure chat message and chart routed to Dr. Gallardo

## 2024-11-19 NOTE — TELEPHONE ENCOUNTER
Outbound call placed to patient, no answer. Unable to leave detailed message due to communication form not filled out fully.     Stated to call the office back or to reply to the [employer] message. Provided office telephone number.     JORGE Mccullough routed to PCP to review as well.

## 2024-11-19 NOTE — TELEPHONE ENCOUNTER
Received secure chat message response from Dr. Gallardo.      11/19/24 0946      MRI BRAIN 11/17/24

## 2024-12-26 ENCOUNTER — TELEPHONE (OUTPATIENT)
Age: 29
End: 2024-12-26

## 2024-12-26 DIAGNOSIS — G40.909 SEIZURE DISORDER (HCC): Primary | ICD-10-CM

## 2024-12-26 NOTE — TELEPHONE ENCOUNTER
Pt calling in asking if he needs to complete  lamotrigine levels to get DMV form for [place] filled out. Pt states on the form is asked about medication levels.     Please advise. If lab work is needed please contact pt. Thank you.

## 2025-01-10 ENCOUNTER — OFFICE VISIT (OUTPATIENT)
Dept: URGENT CARE | Facility: CLINIC | Age: 30
End: 2025-01-10
Payer: COMMERCIAL

## 2025-01-10 VITALS
BODY MASS INDEX: 39.43 KG/M2 | DIASTOLIC BLOOD PRESSURE: 92 MMHG | RESPIRATION RATE: 20 BRPM | SYSTOLIC BLOOD PRESSURE: 138 MMHG | HEART RATE: 138 BPM | OXYGEN SATURATION: 96 % | TEMPERATURE: 98.9 F | WEIGHT: 267 LBS

## 2025-01-10 DIAGNOSIS — R21 RASH: ICD-10-CM

## 2025-01-10 DIAGNOSIS — N48.1 BALANITIS: Primary | ICD-10-CM

## 2025-01-10 PROCEDURE — 99213 OFFICE O/P EST LOW 20 MIN: CPT | Performed by: PHYSICIAN ASSISTANT

## 2025-01-10 RX ORDER — CLOTRIMAZOLE 1 G/ML
1 SOLUTION TOPICAL 2 TIMES DAILY
Qty: 60 ML | Refills: 0 | Status: SHIPPED | OUTPATIENT
Start: 2025-01-10 | End: 2025-01-31

## 2025-01-10 NOTE — PROGRESS NOTES
"  [facility]        NAME: Pastor Tate is a 29 y.o. male  : 1995    MRN: [id number]  DATE: January 10, 2025  TIME: 5:02 PM    Assessment and Plan   Balanitis [N48.1]  1. Balanitis  clotrimazole 1 % external solution    Ambulatory Referral to Urology      2. Rash          Suspect balanitis from yeast.  Will treat with clotrimazole.  If no improvement may consider bacterial cause and treat with Keflex.  Recommend patient follow-up with urology for recurrent balanitis infections.    Patient Instructions     Patient Instructions   Use the antifungal medication twice a day for 1 to 3 weeks.  If symptoms are not improving or seem to be worsening call the office and I may try an antibiotic.  Follow-up with urology.    Patient Education     Balanitis in adults   The Basics   Written by the doctors and editors at [facility]   What is balanitis? -- Balanitis is the medical term for when the head of the penis or the clitoris is swollen, sore, or inflamed.  This condition most often affects the penis. This article is about that type.  What are the symptoms of balanitis? -- The symptoms often get worse over 3 to 7 days and can include:   Pain, tenderness, or itching on the head of the penis (also called the \"glans\") (figure 1)   Red sores on the head of the penis or the skin around the head of the penis (also called the foreskin or \"prepuce\")   Thick, bad-smelling pus on the head of the penis  In a person who is not circumcised, if these symptoms are not treated, the penis can swell and the foreskin can get stuck to the head of the penis or just below the head of the penis. The foreskin can also form scars.  In some cases, balanitis can make it hard to urinate.  Should I see a doctor or nurse? -- Yes. If you have the symptoms described above, see your doctor or nurse right away.  Go to the emergency department if you are not circumcised and your foreskin is stuck just below the head of your penis and won't " "move. This could cause permanent damage (figure 2).  Will I need tests? -- Probably. Your doctor or nurse will need to find out what might be causing your balanitis. To do that, they will probably:   Take samples of the fluid from your penis to check for infection   Order blood tests to check for diabetes or infection  How is balanitis treated? -- Treatment varies depending on the cause of the balanitis:   If your balanitis is caused by a yeast infection, you will be treated with medicines called \"antifungals.\" These medicines come as a cream or gel that you put on your penis, or as pills that you take by mouth. Yeast infections most often affect males with diabetes.   If your balanitis happened because you did not clean your penis well, you will have to rinse your penis twice a day. After your symptoms get better, you will need to begin cleaning yourself better with mild soap and water every day. (But don't use soap while you still have symptoms. Soap can make symptoms worse.)   If your balanitis is caused by infection with bacteria, you will be treated with antibiotic pills. Infections that can cause balanitis include sexually transmitted infections (\"STIs\").   If your balanitis is caused by an allergy or skin reaction to soaps, medicines, or chemicals, you will be treated with steroid creams or ointments. These steroids reduce inflammation and help the skin heal.   In rare cases, balanitis is a symptom of cancer or a sign that cancer might soon develop. If your balanitis is related to cancer, you will get treated with surgery or cancer medicines.  Can balanitis be prevented? -- Yes. You can reduce your chances of getting balanitis by keeping your penis clean. This is especially important if you are not circumcised.  After your symptoms are cured:   Wash your penis every day with soap and water.   If you are not circumcised, pull back the foreskin to clean under it. Then, dry the skin there before you put the " "foreskin back.  Keeping your penis clean is important, but it is also possible to go too far. Clean the area gently with water that is not too hot. Use mild soap, but do not scrub the area too roughly.  What problems should I watch for? -- If not treated, balanitis can lead to problems.  Go to the emergency department if:   Your foreskin is stuck just below the head of your penis and won't move.  Call your doctor or nurse for advice if:   You are not able to pull your foreskin back.   You have pain when urinating or trouble urinating.   You have problems with sex.   Your symptoms do not get better after treatment.  All topics are updated as new evidence becomes available and our peer review process is complete.  This topic retrieved from [employer] on: Mar 06, 2024.  Topic 06699 Version 17.0  Release: 32.2.4 - C32.64  © 2024 UpToDate, Inc. and/or its affiliates. All rights reserved.  figure 1: Anatomy of the penis     The medical term for the head of the penis is the \"glans penis.\" If you are not circumcised, the foreskin covers the glans penis. The medical term for the foreskin is the \"prepuce.\"  Graphic 61421 Version 3.0  figure 2: Paraphimosis     The foreskin can sometimes get stuck below the head of the penis. This is called \"paraphimosis.\" If this happens to you or your child, do not try to force the skin to move. Go to a hospital emergency department for treatment. If this problem is not treated right away, it can cause permanent damage.  Graphic 13879 Version 3.0  Consumer Information Use and Disclaimer   Disclaimer: This generalized information is a limited summary of diagnosis, treatment, and/or medication information. It is not meant to be comprehensive and should be used as a tool to help the user understand and/or assess potential diagnostic and treatment options. It does NOT include all information about conditions, treatments, medications, side effects, or risks that may apply to a specific patient. It " is not intended to be medical advice or a substitute for the medical advice, diagnosis, or treatment of a health care provider based on the health care provider's examination and assessment of a patient's specific and unique circumstances. Patients must speak with a health care provider for complete information about their health, medical questions, and treatment options, including any risks or benefits regarding use of medications. This information does not endorse any treatments or medications as safe, effective, or approved for treating a specific patient. UpToDate, Inc. and its affiliates disclaim any warranty or liability relating to this information or the use thereof.The use of this information is governed by the Terms of Use, available at https://www.my4oneone.com/en/know/clinical-effectiveness-terms. 2024© UpToDate, Inc. and its affiliates and/or licensors. All rights reserved.  Copyright   © 2024 UpToDate, Inc. and/or its affiliates. All rights reserved.        Follow up with PCP in 3-5 days.  Proceed to  ER if symptoms worsen.    Chief Complaint     Chief Complaint   Patient presents with    Rash     Reports perineal itching, pain and redness x 1 month that has worsening. Does report occasional urinary pain. Using OTC powder that has been ineffective.          History of Present Illness       The patient is a 29-year-old male presenting today for itching, pain and erythema noted on the head of the glans of the penis. He notes that symptoms have been present for the last month and have been worsening.  Reports itching and then feels pain in the area after itching.  Has tried an over-the-counter powder which did not work.  Has been seen for similar symptoms at the care now.         Review of Systems   Review of Systems   Genitourinary:  Positive for dysuria (occasional), genital sores and penile pain. Negative for penile discharge.         Current Medications       Current Outpatient Medications:      Breo Ellipta 100-25 MCG/ACT inhaler, Inhale 1 puff daily Rinse mouth after use., Disp: 180 blister, Rfl: 1    clotrimazole 1 % external solution, Apply 1 Application topically 2 (two) times a day for 21 days, Disp: 60 mL, Rfl: 0    fluticasone (FLONASE) 50 mcg/act nasal spray, 1 spray into each nostril daily, Disp: 54 mL, Rfl: 3    guanFACINE (TENEX) 1 mg tablet, Take 1 tablet (1 mg total) by mouth daily at bedtime, Disp: , Rfl:     lamoTRIgine  MG TB24, Take 300 mg by mouth daily, Disp: , Rfl:     levalbuterol (Xopenex HFA) 45 mcg/act inhaler, Inhale 1-2 puffs every 4 (four) hours as needed for wheezing, Disp: 15 g, Rfl: 3    mupirocin (BACTROBAN) 2 % ointment, Apply topically 3 (three) times a day (Patient taking differently: Apply topically 3 (three) times a day), Disp: 22 g, Rfl: 0    Spacer/Aero-Holding Chambers (Vortex Valved Holding Chamber) VOLODYMYR, Use 2 (two) times a day, Disp: 1 each, Rfl: 0    terbinafine (LamISIL) 1 % cream, Apply topically daily (Patient taking differently: Apply topically daily), Disp: 42 g, Rfl: 0    valACYclovir (VALTREX) 1,000 mg tablet, TAKE 2 TABLETS AT ONSET OF HERPES OUTBREAK, THEN TAKE ANOTHER 2 TABLETS IN 12 HOURS., Disp: 360 tablet, Rfl: 1    valACYclovir (VALTREX) 1,000 mg tablet, Take 1 tablet (1,000 mg total) by mouth 3 (three) times a day for 7 days (Patient not taking: Reported on 1/10/2025), Disp: 21 tablet, Rfl: 0    Current Allergies     Allergies as of 01/10/2025 - Reviewed 01/10/2025   Allergen Reaction Noted    Cefaclor Hives 10/13/2020    Erythromycin Other (See Comments) 10/13/2020            The following portions of the patient's history were reviewed and updated as appropriate: allergies, current medications, past family history, past medical history, past social history, past surgical history and problem list.     Past Medical History:   Diagnosis Date    ADHD (attention deficit hyperactivity disorder)     Asthma        History reviewed. No pertinent  surgical history.    Family History   Problem Relation Age of Onset    No Known Problems Mother     No Known Problems Father          Medications have been verified.        Objective   /92   Pulse (!) 138   Temp 98.9 °F (37.2 °C) (Tympanic)   Resp 20   Wt 121 kg (267 lb)   SpO2 96%   BMI 39.43 kg/m²        Physical Exam     Physical Exam  Vitals and nursing note reviewed. Exam conducted with a chaperone present.   Constitutional:       General: He is not in acute distress.     Appearance: Normal appearance. He is normal weight. He is not ill-appearing, toxic-appearing or diaphoretic.   Cardiovascular:      Rate and Rhythm: Regular rhythm. Tachycardia present.   Pulmonary:      Effort: Pulmonary effort is normal.      Breath sounds: Normal breath sounds.   Genitourinary:     Pubic Area: No rash or pubic lice.       Penis: Swelling present. No phimosis.       Testes:         Right: Mass not present.         Left: Mass not present.      Comments: Swelling of the glans with circumferential lesions   White discharge from the lesions   Neurological:      Mental Status: He is alert.

## 2025-01-10 NOTE — PATIENT INSTRUCTIONS
"Use the antifungal medication twice a day for 1 to 3 weeks.  If symptoms are not improving or seem to be worsening call the office and I may try an antibiotic.  Follow-up with urology.    Patient Education     Balanitis in adults   The Basics   Written by the doctors and editors at [facility]   What is balanitis? -- Balanitis is the medical term for when the head of the penis or the clitoris is swollen, sore, or inflamed.  This condition most often affects the penis. This article is about that type.  What are the symptoms of balanitis? -- The symptoms often get worse over 3 to 7 days and can include:   Pain, tenderness, or itching on the head of the penis (also called the \"glans\") (figure 1)   Red sores on the head of the penis or the skin around the head of the penis (also called the foreskin or \"prepuce\")   Thick, bad-smelling pus on the head of the penis  In a person who is not circumcised, if these symptoms are not treated, the penis can swell and the foreskin can get stuck to the head of the penis or just below the head of the penis. The foreskin can also form scars.  In some cases, balanitis can make it hard to urinate.  Should I see a doctor or nurse? -- Yes. If you have the symptoms described above, see your doctor or nurse right away.  Go to the emergency department if you are not circumcised and your foreskin is stuck just below the head of your penis and won't move. This could cause permanent damage (figure 2).  Will I need tests? -- Probably. Your doctor or nurse will need to find out what might be causing your balanitis. To do that, they will probably:   Take samples of the fluid from your penis to check for infection   Order blood tests to check for diabetes or infection  How is balanitis treated? -- Treatment varies depending on the cause of the balanitis:   If your balanitis is caused by a yeast infection, you will be treated with medicines called \"antifungals.\" These medicines come as a cream or gel " "that you put on your penis, or as pills that you take by mouth. Yeast infections most often affect males with diabetes.   If your balanitis happened because you did not clean your penis well, you will have to rinse your penis twice a day. After your symptoms get better, you will need to begin cleaning yourself better with mild soap and water every day. (But don't use soap while you still have symptoms. Soap can make symptoms worse.)   If your balanitis is caused by infection with bacteria, you will be treated with antibiotic pills. Infections that can cause balanitis include sexually transmitted infections (\"STIs\").   If your balanitis is caused by an allergy or skin reaction to soaps, medicines, or chemicals, you will be treated with steroid creams or ointments. These steroids reduce inflammation and help the skin heal.   In rare cases, balanitis is a symptom of cancer or a sign that cancer might soon develop. If your balanitis is related to cancer, you will get treated with surgery or cancer medicines.  Can balanitis be prevented? -- Yes. You can reduce your chances of getting balanitis by keeping your penis clean. This is especially important if you are not circumcised.  After your symptoms are cured:   Wash your penis every day with soap and water.   If you are not circumcised, pull back the foreskin to clean under it. Then, dry the skin there before you put the foreskin back.  Keeping your penis clean is important, but it is also possible to go too far. Clean the area gently with water that is not too hot. Use mild soap, but do not scrub the area too roughly.  What problems should I watch for? -- If not treated, balanitis can lead to problems.  Go to the emergency department if:   Your foreskin is stuck just below the head of your penis and won't move.  Call your doctor or nurse for advice if:   You are not able to pull your foreskin back.   You have pain when urinating or trouble urinating.   You have problems " "with sex.   Your symptoms do not get better after treatment.  All topics are updated as new evidence becomes available and our peer review process is complete.  This topic retrieved from [employer] on: Mar 06, 2024.  Topic 06167 Version 17.0  Release: 32.2.4 - C32.64  © 2024 UpToDate, Inc. and/or its affiliates. All rights reserved.  figure 1: Anatomy of the penis     The medical term for the head of the penis is the \"glans penis.\" If you are not circumcised, the foreskin covers the glans penis. The medical term for the foreskin is the \"prepuce.\"  Graphic 75158 Version 3.0  figure 2: Paraphimosis     The foreskin can sometimes get stuck below the head of the penis. This is called \"paraphimosis.\" If this happens to you or your child, do not try to force the skin to move. Go to a hospital emergency department for treatment. If this problem is not treated right away, it can cause permanent damage.  Graphic 02329 Version 3.0  Consumer Information Use and Disclaimer   Disclaimer: This generalized information is a limited summary of diagnosis, treatment, and/or medication information. It is not meant to be comprehensive and should be used as a tool to help the user understand and/or assess potential diagnostic and treatment options. It does NOT include all information about conditions, treatments, medications, side effects, or risks that may apply to a specific patient. It is not intended to be medical advice or a substitute for the medical advice, diagnosis, or treatment of a health care provider based on the health care provider's examination and assessment of a patient's specific and unique circumstances. Patients must speak with a health care provider for complete information about their health, medical questions, and treatment options, including any risks or benefits regarding use of medications. This information does not endorse any treatments or medications as safe, effective, or approved for treating a specific " patient. UpToDate, Inc. and its affiliates disclaim any warranty or liability relating to this information or the use thereof.The use of this information is governed by the Terms of Use, available at https://www.woltersPepperdatauwer.com/en/know/clinical-effectiveness-terms. 2024© UpToDate, Inc. and its affiliates and/or licensors. All rights reserved.  Copyright   © 2024 UpToDate, Inc. and/or its affiliates. All rights reserved.

## 2025-01-22 ENCOUNTER — TELEPHONE (OUTPATIENT)
Age: 30
End: 2025-01-22

## 2025-01-22 NOTE — TELEPHONE ENCOUNTER
Left detailed message for patient reminding him of his appointment on 2/3/25, new office address and phone number. Also reminded him to get Lamictal level drawn before appointment.

## 2025-01-27 ENCOUNTER — OFFICE VISIT (OUTPATIENT)
Dept: FAMILY MEDICINE CLINIC | Facility: CLINIC | Age: 30
End: 2025-01-27
Payer: COMMERCIAL

## 2025-01-27 VITALS
TEMPERATURE: 98 F | RESPIRATION RATE: 16 BRPM | DIASTOLIC BLOOD PRESSURE: 85 MMHG | HEIGHT: 68 IN | HEART RATE: 84 BPM | BODY MASS INDEX: 40.71 KG/M2 | SYSTOLIC BLOOD PRESSURE: 125 MMHG | WEIGHT: 268.6 LBS

## 2025-01-27 DIAGNOSIS — Z00.00 WELL ADULT EXAM: Primary | ICD-10-CM

## 2025-01-27 DIAGNOSIS — G40.309 GENERALIZED IDIOPATHIC EPILEPSY AND EPILEPTIC SYNDROMES, NOT INTRACTABLE, WITHOUT STATUS EPILEPTICUS (HCC): ICD-10-CM

## 2025-01-27 LAB — LAMOTRIGINE SERPL-MCNC: 5.4 UG/ML (ref 2–20)

## 2025-01-27 PROCEDURE — 99395 PREV VISIT EST AGE 18-39: CPT | Performed by: FAMILY MEDICINE

## 2025-01-27 NOTE — PROGRESS NOTES
"Adult Annual Physical  Name: Pastor Tate      : 1995      MRN: [id number]  Encounter Provider: Yun Dan MD  Encounter Date: 2025   Encounter department: [facility]    Assessment & Plan  Well adult exam         Generalized idiopathic epilepsy and epileptic syndromes, not intractable, without status epilepticus (HCC)  He needs a form from [facility] clearing him to drive. I did let him know I will need to see neurology clearance first prior to me completing my form. He has appt with neurologist next week. His form will stay in my folder until then.        Immunizations and preventive care screenings were discussed with patient today. Appropriate education was printed on patient's after visit summary.         History of Present Illness     Adult Annual Physical:  Patient presents for annual physical.     Diet and Physical Activity:  - Diet/Nutrition: well balanced diet.  - Exercise: 5-7 times a week on average and walking.    General Health:  - Sleep: sleeps well.  - Hearing: normal hearing bilateral ears.  - Vision: no vision problems.  - Dental: regular dental visits.     Health:    - Urinary symptoms: none.     Review of Systems   Constitutional:  Negative for diaphoresis, fatigue and fever.   Respiratory:  Negative for shortness of breath.    Cardiovascular:  Negative for chest pain and palpitations.   Gastrointestinal:  Negative for abdominal pain, nausea and vomiting.   Musculoskeletal:  Negative for back pain and neck pain.   Neurological:  Negative for dizziness, syncope, weakness, light-headedness and headaches.   Psychiatric/Behavioral:  Negative for confusion.          Objective   /85   Pulse 84   Temp 98 °F (36.7 °C)   Resp 16   Ht 5' 8\" (1.727 m)   Wt 122 kg (268 lb 9.6 oz)   BMI 40.84 kg/m²     Physical Exam  Constitutional:       General: He is not in acute distress.     Appearance: Normal appearance. He is normal weight. He is not ill-appearing, " toxic-appearing or diaphoretic.   HENT:      Head: Normocephalic and atraumatic.      Right Ear: Tympanic membrane, ear canal and external ear normal. There is no impacted cerumen.      Left Ear: Tympanic membrane, ear canal and external ear normal. There is no impacted cerumen.      Nose: Nose normal.      Mouth/Throat:      Mouth: Mucous membranes are moist.      Pharynx: Oropharynx is clear. No oropharyngeal exudate or posterior oropharyngeal erythema.   Eyes:      General: No scleral icterus.        Right eye: No discharge.         Left eye: No discharge.      Extraocular Movements: Extraocular movements intact.      Conjunctiva/sclera: Conjunctivae normal.      Pupils: Pupils are equal, round, and reactive to light.   Cardiovascular:      Rate and Rhythm: Normal rate and regular rhythm.      Pulses: Normal pulses.      Heart sounds: Normal heart sounds. No murmur heard.     No friction rub. No gallop.   Pulmonary:      Effort: Pulmonary effort is normal. No respiratory distress.      Breath sounds: Normal breath sounds. No stridor. No wheezing, rhonchi or rales.   Chest:      Chest wall: No tenderness.   Abdominal:      General: Abdomen is flat. Bowel sounds are normal. There is no distension.      Palpations: Abdomen is soft. There is no mass.      Tenderness: There is no abdominal tenderness. There is no guarding or rebound.      Hernia: No hernia is present.   Musculoskeletal:         General: Normal range of motion.   Skin:     General: Skin is warm and dry.   Neurological:      General: No focal deficit present.      Mental Status: He is alert and oriented to person, place, and time.   Psychiatric:         Mood and Affect: Mood normal.         Behavior: Behavior normal.         Thought Content: Thought content normal.         Judgment: Judgment normal.

## 2025-01-28 ENCOUNTER — OFFICE VISIT (OUTPATIENT)
Dept: URGENT CARE | Facility: CLINIC | Age: 30
End: 2025-01-28
Payer: COMMERCIAL

## 2025-01-28 VITALS
WEIGHT: 268 LBS | HEART RATE: 108 BPM | RESPIRATION RATE: 20 BRPM | OXYGEN SATURATION: 97 % | HEIGHT: 68 IN | SYSTOLIC BLOOD PRESSURE: 152 MMHG | DIASTOLIC BLOOD PRESSURE: 96 MMHG | BODY MASS INDEX: 40.62 KG/M2 | TEMPERATURE: 97.6 F

## 2025-01-28 DIAGNOSIS — L01.00 IMPETIGO: Primary | ICD-10-CM

## 2025-01-28 PROCEDURE — 99213 OFFICE O/P EST LOW 20 MIN: CPT | Performed by: PHYSICIAN ASSISTANT

## 2025-01-28 RX ORDER — MUPIROCIN 20 MG/G
OINTMENT TOPICAL 3 TIMES DAILY
Qty: 22 G | Refills: 0 | Status: SHIPPED | OUTPATIENT
Start: 2025-01-28

## 2025-01-28 NOTE — PROGRESS NOTES
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 Impetigo  mupirocin (BACTROBAN) 2 % ointment        If no improvement with the antibiotic ointment call back and I will send in an oral antibiotic.    Patient Instructions     Patient Instructions   Patient Education     Impetigo   The Basics   Written by the doctors and editors at [facility]   What is impetigo? -- Impetigo is a skin infection that usually affects children. It can happen if bacteria (germs) get into cuts, scrapes, or other small openings in the skin.  Impetigo is most common when the weather is warm and humid. It spreads easily between people who live together or spend a lot of time together.  What are the symptoms of impetigo? -- Impetigo usually causes sores on the skin, most often on the face, arms, or legs. The sores often have scabs that form a yellow, gold, or brown crust (picture 1). The skin around the sores might also be red.  Some people with impetigo can have blisters. When the blisters break, they can leave painful sores and scabs (picture 5).  Is there a test for impetigo? -- A doctor or nurse can usually tell if a person has impetigo just by looking at their skin. In some cases, they might take samples from 1 of the blisters for a special test. This test looks for bacteria and can help tell if you or your child have impetigo. But the test is not always needed.  How is impetigo treated? -- That depends on how bad the infection is.  If there are just a few affected spots that do not go deep into the skin, you or your child might just need an antibiotic cream or ointment. But if a lot of the skin is affected, or the infection goes deep, you or your child might need to take antibiotics by mouth.   The antibiotic ointment that experts recommend is a prescription medicine called mupirocin (sample brand  name: Bactroban) or retapamulin (sample brand name: Altabax). You must put this medicine on the infected parts of the skin. Do this for as long as the doctor or nurse tells you to. Otherwise, the infection could come back.   If antibiotics by mouth are needed, you or your child will probably have to take them for several days. Take all of the antibiotics prescribed, even if the skin clears up before the medicine is finished.  When can I go back to work or school? -- People with impetigo can go back to school or work 24 hours after starting treatment. If you or your child have sores or blisters that are draining, they should be covered with a bandage while they heal.  What problems should I watch for? -- Call the doctor or nurse for advice if:   You or your child have signs that the infection is getting worse - These include:   Fever of 100.4°F (38°C) or higher   Chills   Pain, swelling, or warmth around the impetigo   The impetigo is not getting better after 2 or 3 days of treatment, or is getting worse.  How can I keep impetigo from spreading? -- The best way to keep from spreading or catching impetigo is to wash your hands often with soap and water. Make sure that your child washes their hands properly, too. When washing is not possible, you can use an alcohol-based hand rub.  If someone in your home has impetigo, they should not share personal items like towels or clothing. Clean surfaces and items that are commonly touched, like doorknobs, to lower the risk of spreading the infection.  All topics are updated as new evidence becomes available and our peer review process is complete.  This topic retrieved from [employer] on: Mar 13, 2024.  Topic 33619 Version 9.0  Release: 32.2.4 - C32.71  © 2024 UpToDate, Inc. and/or its affiliates. All rights reserved.  picture 1: Impetigo around the nose     When you have impetigo, your skin forms yellow, gold, or brown crusts. It can also turn red under the crusts.  Graphic  399266 Version 5.0  picture 5: Impetigo sores     Sometimes, impetigo causes red-rimmed, painful sores on the skin.  Graphic 385399 Version 5.0  Consumer Information Use and Disclaimer   Disclaimer: This generalized information is a limited summary of diagnosis, treatment, and/or medication information. It is not meant to be comprehensive and should be used as a tool to help the user understand and/or assess potential diagnostic and treatment options. It does NOT include all information about conditions, treatments, medications, side effects, or risks that may apply to a specific patient. It is not intended to be medical advice or a substitute for the medical advice, diagnosis, or treatment of a health care provider based on the health care provider's examination and assessment of a patient's specific and unique circumstances. Patients must speak with a health care provider for complete information about their health, medical questions, and treatment options, including any risks or benefits regarding use of medications. This information does not endorse any treatments or medications as safe, effective, or approved for treating a specific patient. UpToDate, Inc. and its affiliates disclaim any warranty or liability relating to this information or the use thereof.The use of this information is governed by the Terms of Use, available at https://www.[employer] Jewellersuwer.com/en/know/clinical-effectiveness-terms. 2024© UpToDate, Inc. and its affiliates and/or licensors. All rights reserved.  Copyright   © 2024 UpToDate, Inc. and/or its affiliates. All rights reserved.        Follow up with PCP in 3-5 days.  Proceed to  ER if symptoms worsen.    Chief Complaint     Chief Complaint   Patient presents with    Rash     Honey crusted rash on ;eft and right side of mouth has used anti fungal cream, anti itch cream.         History of Present Illness       The patient is a 29-year-old male presenting today for rash around his mouth x 7  days.  Tried antifungal and anti-itch cream over-the-counter which did not help.  Reports noticing a yellowish colored crust on the area.  1 prior episode that resolved with mupirocin ointment.  Denies fevers or sore throat.        Review of Systems   Review of Systems   Constitutional:  Negative for activity change, appetite change, chills, diaphoresis and fever.   HENT:  Negative for congestion, rhinorrhea and sore throat.    Respiratory:  Negative for cough, chest tightness and shortness of breath.    Cardiovascular:  Negative for chest pain and palpitations.   Gastrointestinal:  Negative for abdominal pain, diarrhea, nausea and vomiting.   Musculoskeletal:  Negative for arthralgias and myalgias.   Skin:  Positive for rash. Negative for color change and pallor.   Neurological:  Negative for headaches.         Current Medications       Current Outpatient Medications:     fluticasone (FLONASE) 50 mcg/act nasal spray, AS DIRECTED, Disp: 16 mL, Rfl: 2    fluticasone (FLONASE) 50 mcg/act nasal spray, 2 sprays into each nostril daily, Disp: 18.2 mL, Rfl: 3    guanFACINE (TENEX) 1 mg tablet, Take 1 tablet (1 mg total) by mouth daily at bedtime, Disp: , Rfl:     lamoTRIgine  MG TB24, Take 300 mg by mouth daily, Disp: , Rfl:     levalbuterol (Xopenex HFA) 45 mcg/act inhaler, Inhale 1-2 puffs every 4 (four) hours as needed for wheezing, Disp: 15 g, Rfl: 3    mupirocin (BACTROBAN) 2 % ointment, Apply topically 3 (three) times a day, Disp: 22 g, Rfl: 0    Spacer/Aero-Holding Chambers (Vortex Valved Holding Chamber) VOLODYMYR, Use 2 (two) times a day, Disp: 1 each, Rfl: 0    clotrimazole 1 % external solution, Apply 1 Application topically 2 (two) times a day for 21 days, Disp: 60 mL, Rfl: 0    Fluticasone-Salmeterol (Advair Diskus) 250-50 mcg/dose inhaler, Inhale 1 puff 2 (two) times a day Rinse mouth after use. (Patient not taking: Reported on 1/28/2025), Disp: 60 blister, Rfl: 5    mupirocin (BACTROBAN) 2 % ointment,  "Apply topically 3 (three) times a day (Patient taking differently: Apply topically 3 (three) times a day), Disp: 22 g, Rfl: 0    terbinafine (LamISIL) 1 % cream, Apply topically daily (Patient taking differently: Apply topically daily), Disp: 42 g, Rfl: 0    valACYclovir (VALTREX) 1,000 mg tablet, TAKE 2 TABLETS AT ONSET OF HERPES OUTBREAK, THEN TAKE ANOTHER 2 TABLETS IN 12 HOURS., Disp: 360 tablet, Rfl: 1    valACYclovir (VALTREX) 1,000 mg tablet, Take 1 tablet (1,000 mg total) by mouth 3 (three) times a day for 7 days (Patient not taking: Reported on 1/27/2025), Disp: 21 tablet, Rfl: 0    Current Allergies     Allergies as of 01/28/2025 - Reviewed 01/28/2025   Allergen Reaction Noted    Cefaclor Hives 10/13/2020    Erythromycin Other (See Comments) 10/13/2020            The following portions of the patient's history were reviewed and updated as appropriate: allergies, current medications, past family history, past medical history, past social history, past surgical history and problem list.     Past Medical History:   Diagnosis Date    ADHD (attention deficit hyperactivity disorder)     Asthma        No past surgical history on file.    Family History   Problem Relation Age of Onset    No Known Problems Mother     No Known Problems Father          Medications have been verified.        Objective   /96   Pulse (!) 108   Temp 97.6 °F (36.4 °C)   Resp 20   Ht 5' 8\" (1.727 m)   Wt 122 kg (268 lb)   SpO2 97%   BMI 40.75 kg/m²        Physical Exam     Physical Exam  Vitals and nursing note reviewed.   Constitutional:       General: He is not in acute distress.     Appearance: Normal appearance. He is normal weight. He is not ill-appearing, toxic-appearing or diaphoretic.   HENT:      Mouth/Throat:      Mouth: Mucous membranes are moist.      Pharynx: Oropharynx is clear. No oropharyngeal exudate or posterior oropharyngeal erythema.      Comments: Patches of erythema with honey colored crusting around the " patient's outer corner of his mouth bilaterally.  Cardiovascular:      Rate and Rhythm: Normal rate.   Pulmonary:      Effort: Pulmonary effort is normal.   Neurological:      Mental Status: He is alert.

## 2025-01-28 NOTE — PATIENT INSTRUCTIONS
Patient Education     Impetigo   The Basics   Written by the doctors and editors at [facility]   What is impetigo? -- Impetigo is a skin infection that usually affects children. It can happen if bacteria (germs) get into cuts, scrapes, or other small openings in the skin.  Impetigo is most common when the weather is warm and humid. It spreads easily between people who live together or spend a lot of time together.  What are the symptoms of impetigo? -- Impetigo usually causes sores on the skin, most often on the face, arms, or legs. The sores often have scabs that form a yellow, gold, or brown crust (picture 1). The skin around the sores might also be red.  Some people with impetigo can have blisters. When the blisters break, they can leave painful sores and scabs (picture 5).  Is there a test for impetigo? -- A doctor or nurse can usually tell if a person has impetigo just by looking at their skin. In some cases, they might take samples from 1 of the blisters for a special test. This test looks for bacteria and can help tell if you or your child have impetigo. But the test is not always needed.  How is impetigo treated? -- That depends on how bad the infection is.  If there are just a few affected spots that do not go deep into the skin, you or your child might just need an antibiotic cream or ointment. But if a lot of the skin is affected, or the infection goes deep, you or your child might need to take antibiotics by mouth.   The antibiotic ointment that experts recommend is a prescription medicine called mupirocin (sample brand name: Bactroban) or retapamulin (sample brand name: Altabax). You must put this medicine on the infected parts of the skin. Do this for as long as the doctor or nurse tells you to. Otherwise, the infection could come back.   If antibiotics by mouth are needed, you or your child will probably have to take them for several days. Take all of the antibiotics prescribed, even if the skin clears  up before the medicine is finished.  When can I go back to work or school? -- People with impetigo can go back to school or work 24 hours after starting treatment. If you or your child have sores or blisters that are draining, they should be covered with a bandage while they heal.  What problems should I watch for? -- Call the doctor or nurse for advice if:   You or your child have signs that the infection is getting worse - These include:   Fever of 100.4°F (38°C) or higher   Chills   Pain, swelling, or warmth around the impetigo   The impetigo is not getting better after 2 or 3 days of treatment, or is getting worse.  How can I keep impetigo from spreading? -- The best way to keep from spreading or catching impetigo is to wash your hands often with soap and water. Make sure that your child washes their hands properly, too. When washing is not possible, you can use an alcohol-based hand rub.  If someone in your home has impetigo, they should not share personal items like towels or clothing. Clean surfaces and items that are commonly touched, like doorknobs, to lower the risk of spreading the infection.  All topics are updated as new evidence becomes available and our peer review process is complete.  This topic retrieved from [employer] on: Mar 13, 2024.  Topic 09339 Version 9.0  Release: 32.2.4 - C32.71  © 2024 UpToDate, Inc. and/or its affiliates. All rights reserved.  picture 1: Impetigo around the nose     When you have impetigo, your skin forms yellow, gold, or brown crusts. It can also turn red under the crusts.  Graphic 722050 Version 5.0  picture 5: Impetigo sores     Sometimes, impetigo causes red-rimmed, painful sores on the skin.  Graphic 700975 Version 5.0  Consumer Information Use and Disclaimer   Disclaimer: This generalized information is a limited summary of diagnosis, treatment, and/or medication information. It is not meant to be comprehensive and should be used as a tool to help the user understand  and/or assess potential diagnostic and treatment options. It does NOT include all information about conditions, treatments, medications, side effects, or risks that may apply to a specific patient. It is not intended to be medical advice or a substitute for the medical advice, diagnosis, or treatment of a health care provider based on the health care provider's examination and assessment of a patient's specific and unique circumstances. Patients must speak with a health care provider for complete information about their health, medical questions, and treatment options, including any risks or benefits regarding use of medications. This information does not endorse any treatments or medications as safe, effective, or approved for treating a specific patient. UpToDate, Inc. and its affiliates disclaim any warranty or liability relating to this information or the use thereof.The use of this information is governed by the Terms of Use, available at https://www.[employer].com/en/know/clinical-effectiveness-terms. 2024© UpToDate, Inc. and its affiliates and/or licensors. All rights reserved.  Copyright   © 2024 UpToDate, Inc. and/or its affiliates. All rights reserved.

## 2025-02-03 ENCOUNTER — OFFICE VISIT (OUTPATIENT)
Age: 30
End: 2025-02-03
Payer: COMMERCIAL

## 2025-02-03 VITALS
SYSTOLIC BLOOD PRESSURE: 132 MMHG | WEIGHT: 261 LBS | HEIGHT: 68 IN | OXYGEN SATURATION: 98 % | HEART RATE: 88 BPM | DIASTOLIC BLOOD PRESSURE: 94 MMHG | BODY MASS INDEX: 39.56 KG/M2

## 2025-02-03 DIAGNOSIS — G40.309 GENERALIZED EPILEPSY (HCC): Primary | ICD-10-CM

## 2025-02-03 PROCEDURE — 99213 OFFICE O/P EST LOW 20 MIN: CPT

## 2025-02-03 RX ORDER — LAMOTRIGINE 300 MG/1
300 TABLET, EXTENDED RELEASE ORAL DAILY
Qty: 30 TABLET | Refills: 6 | Status: SHIPPED | OUTPATIENT
Start: 2025-02-03

## 2025-02-03 NOTE — PROGRESS NOTES
Name: Pastor Tate      : 1995      MRN: [id number]  Encounter Provider: Darryn Gallardo MD  Encounter Date: 2/3/2025   Encounter department: [facility]  :  Assessment & Plan  Generalized epilepsy (HCC)    Orders:    lamoTRIgine  MG TB24; Take 1 tablet (300 mg total) by mouth daily    Patient is a 28 year old right handed male with PMH of myoclonic epilepsy who presents as follow up for seizures. He as last seen 10/23/2024 - was told to continue lamotrigine XR 300mg daily. MRI brain negative for any intraparenchymal lesions. Routine EEG shows generalized spike wave discharges, suggestive of generalized epilepsy.        Today, he is doing well, denies any seizures since last visit. He is compliant with his lamotrigine and denies any side effects. He last had a seizure 2024 in the setting of acute fever.    Plan:  - Continue lamotrigine XR 300mg daily  - Patient has been seizure-free for 6 months while on lamotrigine, filled out DMV form  - If you have any seizures, please stop driving and call our office  - Follow up in 3 months        History of Present Illness   HPI    Patient is a 28 year old right handed male with PMH of myoclonic epilepsy who presents as follow up for seizures. He as last seen 10/23/2024 - was told to continue lamotrigine XR 300mg daily. MRI brain negative for any intraparenchymal lesions. Routine EEG shows generalized spike wave discharges, suggestive of generalized epilepsy.      Today, he is doing well, denies any seizures since last visit. He is compliant with his lamotrigine and denies any side effects. He last had a seizure 2024.    Seizures started at age 15, when he was started on lamotrigine 300mg XR. He remained seizure free for 13 year until  when he had a break through seizure in the setting of an acute fever. He followed up with Dr. Ashley's office in [facility] - told to continue continue lamotrigine 300mg XR.    "  Note from 10/23/2024:  Emanuel Tate is a 28 year old right handed male with PMH of myoclonic epilepsy who presents to establish care for seizures.      He has had seizures since 2011. He has only had two lifetime seizures. First seizure at age 15 when he was on his way to school. His mother was driving him and she then witnessed patient's body \"tense up\" and \"pass out\". He lost consciousness and did not remember the event. The event lasted 2 minutes. Denies urinary incontince, denies tongue bite. He was then referred to a neurologist in [place], [place] where he got an ambulatory EEG and was diagnosed with myoclonic epilepsy. He got an ambulatory EEG - was told he had a myoclonic epilepsy. He was started on Lamictal at the time. Upon further questioning, mother reveals that a few months prior to his first seizure, he would have episodes of arm jerking, lasting about 1-2 seconds. These jerks would cause him to drop objects.      Patient remained seizure free on lamotrigine 300mg XR until 7/26/2024. At the time of seizure, patient had a fever of 103 F from a COVID infection. He was witnessed to \"tense up\" and fall out of his chair. He lost consciousness and started grunting. He bit his tongue, but denies urinary or fecal incontinence. After his seizure, he was tired and confused.      Seizures were controlled with lamotringe 300mg ER. Last seizure was on in the setting of acute fever (103 F) due to COVID. Parents heard a thud and saw him making a \"humming sound\". Denies sleep deprivation or loss of appetite. Durign seizure, he fell out of his chair and was seen to be \"tensing up\" and grunting. He lost conciousness. He had a tongue bite, denies incontinence.      He used to see Dr. Ashley at [facility]. He saw Dr. Ashley after his most recent seizure and he was told continue lamotrigine 300mg XR because the seizure was though to be provoked in the setting of an acute illness. After his breakthrough seizure, his lamictal " level was 2.0.        Family hx:  - Denies any family history of epilepsy or neurological disorders     Event/Seizure semiology:  Event type 1: Generalized tonic clonic  - Frequency/Date of last event: 7/26/2024  - Warning/Aura: no  - Loss of awareness: yes -   - Amnestic to the event: yes -   - Semiology: whole body tonic activity  - Presence of post-ictal period: yes - fatigued and confused   - Sonorus breathing: no  - Incontinence: no  - Tongue biting: yes -      Event type 2: Myoclonic jerks  - Frequency/Date of last event: age 15  - Warning/Aura: no  - Loss of awareness: no  - Amnestic to the event: no  - Semiology: arm jerking  - Presence of post-ictal period: no   - Sonorus breathing: no  - Incontinence: no  - Tongue biting: no     Special Features  Age/Date of seizure onset: Age 15  Status epilepticus: no  Self Injury Seizures: no  Precipitating Factors:  none  Longest seizure free interval: 13 years     Epilepsy Risk Factors:  Abnormal development - walked at 20 months     Current AEDs:  Lamotrigine 300mg XR     Medication side effects: None  Medication adherence: Yes     Prior AEDs:  None     Prior Evaluation:  - routine EEG: no  - cEEG/EMU: no  - MRI brain: no  - PET: no  - fMRI: no  - Ictal SPECT: no  - Neuropsych evaluation: no        History Reviewed:   The following were reviewed and updated as appropriate: allergies, current medications, past family history, past medical history, past social history, past surgical history, and problem list     Psychiatric History:  None     Social History:   Driving: No - stopped driving after last seizure in July 2024  Lives Alone: No - lives with parents and brother  Occupation: He works as a   - Denies tobacco smoking, denies drug use, denies alcohol use.    Review of Systems   Constitutional:  Negative for appetite change, fatigue and fever.   HENT: Negative.  Negative for hearing loss, tinnitus, trouble swallowing and voice change.    Eyes: Negative.  " Negative for photophobia, pain and visual disturbance.   Respiratory: Negative.  Negative for shortness of breath.    Cardiovascular: Negative.  Negative for palpitations.   Gastrointestinal: Negative.  Negative for nausea and vomiting.   Endocrine: Negative.  Negative for cold intolerance.   Genitourinary: Negative.  Negative for dysuria, frequency and urgency.   Musculoskeletal:  Negative for back pain, gait problem, myalgias, neck pain and neck stiffness.   Skin: Negative.  Negative for rash.   Allergic/Immunologic: Negative.    Neurological:  Negative for dizziness, tremors, seizures, syncope, facial asymmetry, speech difficulty, weakness, light-headedness, numbness and headaches.   Hematological: Negative.  Does not bruise/bleed easily.   Psychiatric/Behavioral: Negative.  Negative for confusion, hallucinations and sleep disturbance.     I have personally reviewed the MA's review of systems and made changes as necessary.         Objective   There were no vitals taken for this visit.    Physical Exam  Neurological Exam  /94 (Patient Position: Sitting, Cuff Size: Large)   Pulse 88   Ht 5' 8\" (1.727 m)   Wt 118 kg (261 lb)   SpO2 98%   BMI 39.68 kg/m²      General Exam  General: well developed, no acute distress.  HEENT: mucous membranes moist, anicteric sclera.   Neck: supple, good ROM.      Neurological Exam  Mental Status: awake, alert, and fully oriented to person, place, time, and situation. Attention and memory intact. Fund of knowledge is appropriate for age and education.  There is no neglect.    Language: fluency, and comprehension normal.       Cranial Nerves: Pupils equal and reactive to light.  Visual fields full to confrontation. Extraocular motions intact with full versions, normal pursuits and saccades. Facial strength full and symmetric. Facial sensation intact in V1-V3. Hearing intact to voice. Tongue protrudes to midline. Palate elevates symmetrically. Speech clear without notable " dysarthria. Shoulder shrug activation full and symmetric.    Motor: Normal bulk and tone. No pronator drift. Strength is 5/5 proximally and distally in all 4 extremities. No involuntary movements.    Sensory: Sensation intact to light touch distally in all extremities.    Coordination: Normal finger-to-nose. Normal rapid alternating movements.     Station and gait: Casual and tandem gait normal. Normal Romberg.    Reflexes: Reflexes 2+ throughout and symmetric.   Radiology Results Review: I have reviewed radiology reports from   including: MRI brain.    MRI  BRAIN  - WITH AND WITHOUT CONTRAST, SEIZURE PROTOCOL     INDICATION: G40.409: Other generalized epilepsy and epileptic syndromes, not intractable, without status epilepticus.   Seizure disorder.     COMPARISON: CT head 1/10/2011.     TECHNIQUE:  Multiplanar, multisequence imaging of the brain was performed before and after gadolinium administration.        IV Contrast:  12 mL of Gadobutrol injection (SINGLE-DOSE)     IMAGE QUALITY:   Diagnostic.     FINDINGS:     BRAIN PARENCHYMA: No acute infarct, mass effect or midline shift.     Symmetric hippocampal formations with regard to size and signal.     Postcontrast imaging of the brain demonstrates no abnormal enhancement.     VENTRICLES:  Normal for the patient's age.     SELLA AND PITUITARY GLAND:  Normal.     ORBITS: No acute abnormality.     PARANASAL SINUSES: Scattered mild-moderate mucosal thickening of the paranasal sinuses, notably expansion of the left sphenoid sinus with heterogeneous signal intensity material.     VASCULATURE:  Evaluation of the major intracranial vasculature demonstrates appropriate flow voids.     CALVARIUM AND SKULL BASE: No acute abnormality.     EXTRACRANIAL SOFT TISSUES: No acute abnormality.     IMPRESSION:     1. No acute infarct, mass effect or midline shift.     2. Symmetric hippocampal formations with regard to signal intensity and size.     3. Redemonstrated expansion of the  left sphenoid sinus with heterogeneous nonspecific signal intensity material, possibly representing inspissated secretions though a mass lesion is not excluded. Recommend correlation with direct visualization, as   clinically appropriate.       Administrative Statements   I have spent a total time of 30 minutes in caring for this patient on the day of the visit/encounter including Diagnostic results, Prognosis, Risks and benefits of tx options, Instructions for management, Importance of tx compliance, Risk factor reductions, Counseling / Coordination of care, Reviewing / ordering tests, medicine, procedures  , and Obtaining or reviewing history  .

## 2025-03-25 ENCOUNTER — TELEPHONE (OUTPATIENT)
Age: 30
End: 2025-03-25

## 2025-03-25 NOTE — TELEPHONE ENCOUNTER
Patient has been added to the Talk Therapy wait list without a referral for Marriage Counseling.    Insurance: [employer]  Insurance Type:    Commercial [x]   Medicaid []   [facility] (if applicable)   Medicare []  Location Preference: [place], [place]  Provider Preference: Anyone  Virtual: Yes [] No [x]  Were outside resources sent: Yes [x] No [] 824 Flyer Yes

## 2025-04-28 ENCOUNTER — TELEPHONE (OUTPATIENT)
Age: 30
End: 2025-04-28

## 2025-05-08 ENCOUNTER — OFFICE VISIT (OUTPATIENT)
Age: 30
End: 2025-05-08
Payer: COMMERCIAL

## 2025-05-08 VITALS
HEART RATE: 81 BPM | BODY MASS INDEX: 37.44 KG/M2 | WEIGHT: 247 LBS | HEIGHT: 68 IN | SYSTOLIC BLOOD PRESSURE: 110 MMHG | DIASTOLIC BLOOD PRESSURE: 90 MMHG

## 2025-05-08 DIAGNOSIS — F95.9 TIC DISORDER: ICD-10-CM

## 2025-05-08 DIAGNOSIS — G40.309 GENERALIZED EPILEPSY (HCC): ICD-10-CM

## 2025-05-08 DIAGNOSIS — G40.409 MYOCLONIC EPILEPSY (HCC): Primary | ICD-10-CM

## 2025-05-08 PROCEDURE — 99214 OFFICE O/P EST MOD 30 MIN: CPT

## 2025-05-08 RX ORDER — LAMOTRIGINE 50 MG/1
50 TABLET, EXTENDED RELEASE ORAL DAILY
Qty: 30 TABLET | Refills: 6 | Status: SHIPPED | OUTPATIENT
Start: 2025-05-08

## 2025-05-08 RX ORDER — GUANFACINE 1 MG/1
1 TABLET ORAL
Qty: 30 TABLET | Refills: 6 | Status: SHIPPED | OUTPATIENT
Start: 2025-05-08

## 2025-05-08 RX ORDER — LAMOTRIGINE 300 MG/1
300 TABLET, EXTENDED RELEASE ORAL DAILY
Qty: 30 TABLET | Refills: 6 | Status: SHIPPED | OUTPATIENT
Start: 2025-05-08

## 2025-05-08 NOTE — PROGRESS NOTES
Name: Pastor Tate      : 1995      MRN: [id number]  Encounter Provider: Darryn Gallardo MD  Encounter Date: 2025   Encounter department: [facility]  :  Assessment & Plan  Myoclonic epilepsy (HCC)    Orders:    lamoTRIgine ER 50 MG TB24; Take 1 tablet (50 mg total) by mouth in the morning    Lamotrigine level; Future      Generalized epilepsy (HCC)    Orders:    lamoTRIgine  MG TB24; Take 1 tablet (300 mg total) by mouth daily    Tic disorder    Orders:    guanFACINE (TENEX) 1 mg tablet; Take 1 tablet (1 mg total) by mouth daily at bedtime    Patient is a 29 year old right handed male with PMH of myoclonic epilepsy who presents as follow up for seizures. He as last seen 2/3/2025 - was told to continue lamotrigine XR 300mg daily.     Today, he is doing well, denies any seizures since last visit. He is compliant with his lamotrigine and denies any side effects. He last had a seizure 2024 in the setting of acute fever.    Mom and patient are concerned that he may have another seizure if he gets sick. They would like to increase his dosage of lamotrigine.     MRI brain negative for any intraparenchymal lesions. Routine EEG shows generalized spike wave discharges, suggestive of generalized epilepsy.    Plan:  - Increase lamotrigine XR to 350mg daily  - Continue guanfacine   - If you have any seizures, please stop driving and call our office  - Follow up in 6 months      History of Present Illness   BRI   Emanuel is a 29 year old right handed male with PMH of myoclonic epilepsy who presents as follow up for seizures. He as last seen 2/3/2025 - was told to continue lamotrigine XR 300mg daily.     Today, he is doing well, denies any seizures since last visit. He is compliant with his lamotrigine and denies any side effects. He last had a seizure 2024 in the setting of acute fever.    Mom and patient are concerned that he may have another seizure if he  "gets sick. They would like to increase his dosage of lamotrigine.     MRI brain negative for any intraparenchymal lesions. Routine EEG shows generalized spike wave discharges, suggestive of generalized epilepsy.        Intake note from 10/23/2024:  Emanuel Tate is a 28 year old right handed male with PMH of myoclonic epilepsy who presents to [facility] care for seizures.      He has had seizures since 2011. He has only had two lifetime seizures. First seizure at age 15 when he was on his way to school. His mother was driving him and she then witnessed patient's body \"tense up\" and \"pass out\". He lost consciousness and did not remember the event. The event lasted 2 minutes. Denies urinary incontince, denies tongue bite. He was then referred to a neurologist in [place], [place] where he got an ambulatory EEG and was diagnosed with myoclonic epilepsy. He got an ambulatory EEG - was told he had a myoclonic epilepsy. He was started on Lamictal at the time. Upon further questioning, mother reveals that a few months prior to his first seizure, he would have episodes of arm jerking, lasting about 1-2 seconds. These jerks would cause him to drop objects.      Patient remained seizure free on lamotrigine 300mg XR until 7/26/2024. At the time of seizure, patient had a fever of 103 F from a COVID infection. He was witnessed to \"tense up\" and fall out of his chair. He lost consciousness and started grunting. He bit his tongue, but denies urinary or fecal incontinence. After his seizure, he was tired and confused.      Seizures were controlled with lamotringe 300mg ER. Last seizure was on in the setting of acute fever (103 F) due to COVID. Parents heard a thud and saw him making a \"humming sound\". Denies sleep deprivation or loss of appetite. Durign seizure, he fell out of his chair and was seen to be \"tensing up\" and grunting. He lost conciousness. He had a tongue bite, denies incontinence.      He used to see Dr. Ashley at " [facility]. He saw Dr. Ashley after his most recent seizure and he was told continue lamotrigine 300mg XR because the seizure was though to be provoked in the setting of an acute illness. After his breakthrough seizure, his lamictal level was 2.0.        Family hx:  - Denies any family history of epilepsy or neurological disorders     Event/Seizure semiology:  Event type 1: Generalized tonic clonic  - Frequency/Date of last event: 7/26/2024  - Warning/Aura: no  - Loss of awareness: yes -   - Amnestic to the event: yes -   - Semiology: whole body tonic activity  - Presence of post-ictal period: yes - fatigued and confused   - Sonorus breathing: no  - Incontinence: no  - Tongue biting: yes -      Event type 2: Myoclonic jerks  - Frequency/Date of last event: age 15  - Warning/Aura: no  - Loss of awareness: no  - Amnestic to the event: no  - Semiology: arm jerking  - Presence of post-ictal period: no   - Sonorus breathing: no  - Incontinence: no  - Tongue biting: no     Special Features  Age/Date of seizure onset: Age 15  Status epilepticus: no  Self Injury Seizures: no  Precipitating Factors:  none  Longest seizure free interval: 13 years     Epilepsy Risk Factors:  Abnormal development - walked at 20 months     Current AEDs:  Lamotrigine 300mg XR     Medication side effects: None  Medication adherence: Yes     Prior AEDs:  None     Prior Evaluation:  - routine EEG: no  - cEEG/EMU: no  - MRI brain: no  - PET: no  - fMRI: no  - Ictal SPECT: no  - Neuropsych evaluation: no     History Reviewed:   The following were reviewed and updated as appropriate: allergies, current medications, past family history, past medical history, past social history, past surgical history, and problem list     Psychiatric History:  None     Social History:   Driving: No - stopped driving after last seizure in July 2024  Lives Alone: No - lives with parents and brother  Occupation: He works as a   - Denies tobacco smoking, denies drug  "use, denies alcohol use.    Review of Systems   Constitutional:  Negative for appetite change, fatigue and fever.   HENT: Negative.  Negative for hearing loss, tinnitus, trouble swallowing and voice change.    Eyes: Negative.  Negative for photophobia, pain and visual disturbance.   Respiratory: Negative.  Negative for shortness of breath.    Cardiovascular: Negative.  Negative for palpitations.   Gastrointestinal: Negative.  Negative for nausea and vomiting.   Endocrine: Negative.  Negative for cold intolerance.   Genitourinary: Negative.  Negative for dysuria, frequency and urgency.   Musculoskeletal:  Negative for back pain, gait problem, myalgias, neck pain and neck stiffness.   Skin: Negative.  Negative for rash.   Allergic/Immunologic: Negative.    Neurological:  Positive for seizures. Negative for dizziness, tremors, syncope, facial asymmetry, speech difficulty, weakness, light-headedness, numbness and headaches.   Hematological: Negative.  Does not bruise/bleed easily.   Psychiatric/Behavioral: Negative.  Negative for confusion, hallucinations and sleep disturbance.     I have personally reviewed the MA's review of systems and made changes as necessary.       Objective   /90 (BP Location: Left arm, Patient Position: Sitting, Cuff Size: Large)   Pulse 81   Ht 5' 8\" (1.727 m)   Wt 112 kg (247 lb)   BMI 37.56 kg/m²     Physical Exam  Neurological Exam  /90 (BP Location: Left arm, Patient Position: Sitting, Cuff Size: Large)   Pulse 81   Ht 5' 8\" (1.727 m)   Wt 112 kg (247 lb)   BMI 37.56 kg/m²      General Exam  General: well developed, no acute distress.  HEENT: mucous membranes moist, anicteric sclera.   Neck: supple, good ROM.      Neurological Exam  Mental Status: awake, alert, and fully oriented to person, place, time, and situation. Attention and memory intact. Fund of knowledge is appropriate for age and education.  There is no neglect.    Language: fluency, and comprehension normal.  "      Cranial Nerves: Pupils equal and reactive to light.  Visual fields full to confrontation. Extraocular motions intact with full versions, normal pursuits and saccades. Facial strength full and symmetric. Facial sensation intact in V1-V3. Hearing intact to voice. Tongue protrudes to midline. Palate elevates symmetrically. Speech clear without notable dysarthria. Shoulder shrug activation full and symmetric.    Motor: Normal bulk and tone. No pronator drift. Strength is 5/5 proximally and distally in all 4 extremities. No involuntary movements.    Sensory: Sensation intact to light touch distally in all extremities.    Coordination: Normal finger-to-nose. Normal rapid alternating movements.     Station and gait: Casual and tandem gait normal. Normal Romberg.    Reflexes: Reflexes 2+ throughout and symmetric. Both toes down going.    Administrative Statements   I have spent a total time of 30 minutes in caring for this patient on the day of the visit/encounter including Diagnostic results, Risks and benefits of tx options, Patient and family education, Importance of tx compliance, Risk factor reductions, Counseling / Coordination of care, Reviewing/placing orders in the medical record (including tests, medications, and/or procedures), and Obtaining or reviewing history  .

## 2025-05-08 NOTE — ASSESSMENT & PLAN NOTE
Orders:    guanFACINE (TENEX) 1 mg tablet; Take 1 tablet (1 mg total) by mouth daily at bedtime    Patient is a 29 year old right handed male with PMH of myoclonic epilepsy who presents as follow up for seizures. He as last seen 2/3/2025 - was told to continue lamotrigine XR 300mg daily.     Today, he is doing well, denies any seizures since last visit. He is compliant with his lamotrigine and denies any side effects. He last had a seizure 7/26/2024 in the setting of acute fever.    Mom and patient are concerned that he may have another seizure if he gets sick. They would like to increase his dosage of lamotrigine.     MRI brain negative for any intraparenchymal lesions. Routine EEG shows generalized spike wave discharges, suggestive of generalized epilepsy.    Plan:  - Increase lamotrigine XR to 350mg daily  - Continue guanfacine   - If you have any seizures, please stop driving and call our office  - Follow up in 6 months

## 2025-05-08 NOTE — ASSESSMENT & PLAN NOTE
Orders:    lamoTRIgine ER 50 MG TB24; Take 1 tablet (50 mg total) by mouth in the morning    Lamotrigine level; Future

## 2025-05-22 ENCOUNTER — OFFICE VISIT (OUTPATIENT)
Dept: FAMILY MEDICINE CLINIC | Facility: CLINIC | Age: 30
End: 2025-05-22
Payer: COMMERCIAL

## 2025-05-22 VITALS
SYSTOLIC BLOOD PRESSURE: 128 MMHG | RESPIRATION RATE: 16 BRPM | HEIGHT: 68 IN | WEIGHT: 248.8 LBS | TEMPERATURE: 97 F | DIASTOLIC BLOOD PRESSURE: 80 MMHG | BODY MASS INDEX: 37.71 KG/M2 | OXYGEN SATURATION: 98 % | HEART RATE: 100 BPM

## 2025-05-22 DIAGNOSIS — Z13.6 SCREENING FOR CARDIOVASCULAR, RESPIRATORY, AND GENITOURINARY DISEASES: ICD-10-CM

## 2025-05-22 DIAGNOSIS — Z13.83 SCREENING FOR CARDIOVASCULAR, RESPIRATORY, AND GENITOURINARY DISEASES: ICD-10-CM

## 2025-05-22 DIAGNOSIS — Z13.29 SCREENING FOR THYROID DISORDER: ICD-10-CM

## 2025-05-22 DIAGNOSIS — N52.9 ERECTILE DYSFUNCTION, UNSPECIFIED ERECTILE DYSFUNCTION TYPE: Primary | ICD-10-CM

## 2025-05-22 DIAGNOSIS — Z13.0 SCREENING FOR DEFICIENCY ANEMIA: ICD-10-CM

## 2025-05-22 DIAGNOSIS — Z13.89 SCREENING FOR CARDIOVASCULAR, RESPIRATORY, AND GENITOURINARY DISEASES: ICD-10-CM

## 2025-05-22 PROCEDURE — 99214 OFFICE O/P EST MOD 30 MIN: CPT | Performed by: FAMILY MEDICINE

## 2025-05-22 NOTE — PROGRESS NOTES
"Name: Pastor Tate      : 1995      MRN: [id number]  Encounter Provider: Yun Dan MD  Encounter Date: 2025   Encounter department: [facility]  :  Assessment & Plan  Erectile dysfunction, unspecified erectile dysfunction type  He had an appointment with an online doctor for ED and was told have testosterone level checked. Labs ordered.   Orders:    Testosterone, free, total; Future    Screening for deficiency anemia    Orders:    CBC and differential; Future    Screening for thyroid disorder    Orders:    TSH, 3rd generation with Free T4 reflex; Future    Screening for cardiovascular, respiratory, and genitourinary diseases    Orders:    Comprehensive metabolic panel; Future    Lipid Panel with Direct LDL reflex; Future           History of Present Illness   HPI  He has been having erectile dysfunction for the past few months.   No recent changes. This has not happened in the past.   Has trouble having an erection on his own and with a partner.   He has one female partner.     Review of Systems   Constitutional: Negative.    HENT: Negative.     Eyes: Negative.    Respiratory: Negative.     Cardiovascular: Negative.    Gastrointestinal: Negative.    Endocrine: Negative.    Genitourinary: Negative.    Musculoskeletal: Negative.    Neurological: Negative.    Hematological: Negative.    Psychiatric/Behavioral: Negative.         Objective   /80   Pulse 100   Temp (!) 97 °F (36.1 °C)   Resp 16   Ht 5' 8\" (1.727 m)   Wt 113 kg (248 lb 12.8 oz)   SpO2 98%   BMI 37.83 kg/m²      Physical Exam  Vitals and nursing note reviewed.   Constitutional:       General: He is not in acute distress.     Appearance: Normal appearance. He is well-developed.   HENT:      Head: Normocephalic and atraumatic.     Eyes:      Conjunctiva/sclera: Conjunctivae normal.       Cardiovascular:      Rate and Rhythm: Normal rate and regular rhythm.      Pulses: Normal pulses.      Heart sounds: Normal " heart sounds. No murmur heard.  Pulmonary:      Effort: Pulmonary effort is normal. No respiratory distress.      Breath sounds: Normal breath sounds.     Musculoskeletal:         General: No swelling.      Cervical back: Neck supple.     Skin:     General: Skin is warm and dry.      Capillary Refill: Capillary refill takes less than 2 seconds.     Neurological:      Mental Status: He is alert.     Psychiatric:         Mood and Affect: Mood normal.

## 2025-05-27 ENCOUNTER — TELEPHONE (OUTPATIENT)
Age: 30
End: 2025-05-27

## 2025-05-27 ENCOUNTER — RESULTS FOLLOW-UP (OUTPATIENT)
Dept: FAMILY MEDICINE CLINIC | Facility: CLINIC | Age: 30
End: 2025-05-27

## 2025-05-27 LAB
ALBUMIN SERPL-MCNC: 4.7 G/DL (ref 4.1–5.1)
ALP SERPL-CCNC: 85 IU/L (ref 44–121)
ALT SERPL-CCNC: 40 IU/L (ref 0–44)
AST SERPL-CCNC: 23 IU/L (ref 0–40)
BASOPHILS # BLD AUTO: 0.1 X10E3/UL (ref 0–0.2)
BASOPHILS NFR BLD AUTO: 1 %
BILIRUB SERPL-MCNC: 0.5 MG/DL (ref 0–1.2)
BUN SERPL-MCNC: 10 MG/DL (ref 6–20)
BUN/CREAT SERPL: 8 (ref 9–20)
CALCIUM SERPL-MCNC: 10.1 MG/DL (ref 8.7–10.2)
CHLORIDE SERPL-SCNC: 103 MMOL/L (ref 96–106)
CHOLEST SERPL-MCNC: 149 MG/DL (ref 100–199)
CO2 SERPL-SCNC: 22 MMOL/L (ref 20–29)
CREAT SERPL-MCNC: 1.19 MG/DL (ref 0.76–1.27)
EGFR: 84 ML/MIN/1.73
EOSINOPHIL # BLD AUTO: 0.5 X10E3/UL (ref 0–0.4)
EOSINOPHIL NFR BLD AUTO: 6 %
ERYTHROCYTE [DISTWIDTH] IN BLOOD BY AUTOMATED COUNT: 13 % (ref 11.6–15.4)
GLOBULIN SER-MCNC: 2.7 G/DL (ref 1.5–4.5)
GLUCOSE SERPL-MCNC: 87 MG/DL (ref 70–99)
HCT VFR BLD AUTO: 49.7 % (ref 37.5–51)
HDLC SERPL-MCNC: 43 MG/DL
HGB BLD-MCNC: 16.8 G/DL (ref 13–17.7)
IMM GRANULOCYTES # BLD: 0 X10E3/UL (ref 0–0.1)
IMM GRANULOCYTES NFR BLD: 0 %
LDLC SERPL CALC-MCNC: 86 MG/DL (ref 0–99)
LDLC/HDLC SERPL: 2 RATIO (ref 0–3.6)
LYMPHOCYTES # BLD AUTO: 2.2 X10E3/UL (ref 0.7–3.1)
LYMPHOCYTES NFR BLD AUTO: 25 %
MCH RBC QN AUTO: 31.3 PG (ref 26.6–33)
MCHC RBC AUTO-ENTMCNC: 33.8 G/DL (ref 31.5–35.7)
MCV RBC AUTO: 93 FL (ref 79–97)
MICRODELETION SYND BLD/T FISH: NORMAL
MONOCYTES # BLD AUTO: 0.5 X10E3/UL (ref 0.1–0.9)
MONOCYTES NFR BLD AUTO: 6 %
NEUTROPHILS # BLD AUTO: 5.4 X10E3/UL (ref 1.4–7)
NEUTROPHILS NFR BLD AUTO: 62 %
PLATELET # BLD AUTO: 295 X10E3/UL (ref 150–450)
POTASSIUM SERPL-SCNC: 5.1 MMOL/L (ref 3.5–5.2)
PROT SERPL-MCNC: 7.4 G/DL (ref 6–8.5)
RBC # BLD AUTO: 5.37 X10E6/UL (ref 4.14–5.8)
SL AMB VLDL CHOLESTEROL CALC: 20 MG/DL (ref 5–40)
SODIUM SERPL-SCNC: 141 MMOL/L (ref 134–144)
TESTOST FREE SERPL-MCNC: 18.6 PG/ML (ref 8.7–25.1)
TESTOST SERPL-MCNC: 454 NG/DL (ref 264–916)
TRIGL SERPL-MCNC: 110 MG/DL (ref 0–149)
TSH SERPL DL<=0.005 MIU/L-ACNC: 2.25 UIU/ML (ref 0.45–4.5)
WBC # BLD AUTO: 8.6 X10E3/UL (ref 3.4–10.8)

## 2025-05-27 NOTE — TELEPHONE ENCOUNTER
New Patient      Insurance   Current Insurance?[employer]    Insurance E-verified? PPO     History   Reason for appointment/active symptoms?  erectile dysfunction      Has the patient had any previous Urologist(s)? no      Was the patient seen in the ED? no      Labs/Imaging(Including Out Of Network)? testosterone      Records Requested?   Records Visible in EPIC? yes      Personal history of cancer? no      Appointment   Office location preference:    ?   Appointment Details   Date:    Time:    Location:    Provider:    Does the appointment need further review? Patient will call back later to schedule once he has his work schedule for July

## 2025-07-25 ENCOUNTER — TELEPHONE (OUTPATIENT)
Age: 30
End: 2025-07-25

## 2025-07-25 NOTE — TELEPHONE ENCOUNTER
Patient called in, provided fax number to Patient to send over paper work required by [employer] to reinstate his driving privileges.

## 2025-07-31 NOTE — TELEPHONE ENCOUNTER
Patient called in to follow up on status of [place] completing and faxing his seizure forms to [employer].    Please call Patient to advise.

## 2025-08-19 ENCOUNTER — TELEPHONE (OUTPATIENT)
Age: 30
End: 2025-08-19